# Patient Record
Sex: MALE | Race: WHITE | NOT HISPANIC OR LATINO | Employment: FULL TIME | URBAN - METROPOLITAN AREA
[De-identification: names, ages, dates, MRNs, and addresses within clinical notes are randomized per-mention and may not be internally consistent; named-entity substitution may affect disease eponyms.]

---

## 2017-05-11 ENCOUNTER — TELEPHONE (OUTPATIENT)
Dept: FAMILY MEDICINE | Facility: CLINIC | Age: 42
End: 2017-05-11

## 2017-05-11 DIAGNOSIS — F41.1 ANXIETY STATE: ICD-10-CM

## 2017-05-11 RX ORDER — PROPRANOLOL HYDROCHLORIDE 20 MG/1
20 TABLET ORAL DAILY
Qty: 30 TABLET | Refills: 0 | COMMUNITY
Start: 2017-05-11 | End: 2017-07-19

## 2017-05-11 NOTE — TELEPHONE ENCOUNTER
OK refill of propranolol for 1 month sent to [facility]. Pt needs non fasting OV for refills.    Thanks,Leann  [phone] (home)

## 2017-05-17 ENCOUNTER — MYC MEDICAL ADVICE (OUTPATIENT)
Dept: FAMILY MEDICINE | Facility: CLINIC | Age: 42
End: 2017-05-17

## 2017-05-17 NOTE — TELEPHONE ENCOUNTER
Sent patient a [employer] message stating that his brother can see Dr. Bunch or Susie Pyle, but that Dr. Menon is not currently taking new patients.

## 2017-05-22 DIAGNOSIS — L65.9 ALOPECIA: ICD-10-CM

## 2017-05-22 RX ORDER — FINASTERIDE 5 MG/1
5 TABLET, FILM COATED ORAL DAILY
Qty: 9 TABLET | Refills: 0 | Status: SHIPPED | OUTPATIENT
Start: 2017-05-22 | End: 2017-06-29

## 2017-05-22 NOTE — TELEPHONE ENCOUNTER
Gerber Jackson is requesting a refill of:    Pending Prescriptions:                       Disp   Refills    finasteride (PROSCAR) 5 MG tablet         9 tabl*0            Sig: Take 1 tablet (5 mg) by mouth daily 1/4  TABLET           DAILY    Pt last seen 5/20/16. Pt needs Ov for refills.  has called him on 5/11/17 for his other medication    Please advise  Thanks,Leann

## 2017-06-27 DIAGNOSIS — L65.9 ALOPECIA: ICD-10-CM

## 2017-06-27 RX ORDER — FINASTERIDE 5 MG/1
5 TABLET, FILM COATED ORAL DAILY
Qty: 9 TABLET | Refills: 0 | OUTPATIENT
Start: 2017-06-27

## 2017-06-27 NOTE — TELEPHONE ENCOUNTER
Refill Request Received for the following med:  Refused Prescriptions:                       Disp   Refills    finasteride (PROSCAR) 5 MG tablet          9 tabl*0        Sig: Take 1 tablet (5 mg) by mouth daily 1/4  TABLET DAILY  Refused By: ROSA AQUINO  Reason for Refusal: Patient needs appointment    Patient received an extension of this medication on 05/22/2017. This was to be his last prescription since it has been over a year since he has been seen. The patient has not come to be seen and still has no appointments scheduled.    Refused the latest request and sent the refusal to the pharmacy.    Is this ok or would you like to send in a refill?    Patient Call Back Info:  [phone] (home)     Rosa Aquino [facility]

## 2017-06-28 ENCOUNTER — MYC MEDICAL ADVICE (OUTPATIENT)
Dept: FAMILY MEDICINE | Facility: CLINIC | Age: 42
End: 2017-06-28

## 2017-06-28 DIAGNOSIS — L65.9 ALOPECIA: ICD-10-CM

## 2017-06-29 RX ORDER — FINASTERIDE 5 MG/1
TABLET, FILM COATED ORAL
Qty: 10 TABLET | Refills: 0 | Status: SHIPPED | OUTPATIENT
Start: 2017-06-29 | End: 2017-07-19

## 2017-06-29 NOTE — TELEPHONE ENCOUNTER
Gerber Jackson is requesting a refill of:    Pending Prescriptions:                       Disp   Refills    finasteride (PROSCAR) 5 MG tablet         9 tabl*0            Sig: Take 1 tablet (5 mg) by mouth daily 1/4  TABLET           DAILY    Pt has OV on 7/19/17. Are you willing to fill?  Thanks,Leann

## 2017-06-29 NOTE — TELEPHONE ENCOUNTER
From: Leann Amaya CMA  To: Gerber Jackson  Sent: 6/28/2017 2:06 PM CDT  Subject: refill    Veto,    We have denied you refill of finasteride. We have not seen you in over 1 year. Please call us at [phone] to make an appointment for a medication check.    Thanks,Leann

## 2017-07-18 NOTE — PROGRESS NOTES
SUBJECTIVE:     CC: Gerber Jackson is an 42 year old male who presents for preventative health visit.     Healthy Habits:      Amount of exercise or daily activities, outside of work: 5 day(s) per week    Problems taking medications regularly No    Medication side effects: No    Have you had an eye exam in the past two years? yes    Do you see a dentist twice per year? yes    Do you have sleep apnea, excessive snoring or daytime drowsiness?no    1. Performance anxiety  - taking 1-2 x a week. But may take up to 5 days a week if under stress    2. Taking Proscar for hair loss    3. In the last several  months In the am - with heavier workout - will get dizziness  Pain in chest occ. - also occ chest.   Exercising - dizziness with heavy lifting  Random - sometimes in am driving will have some chest pains.  No family hx cardiac events    4. Would like his mercury levels checked - eating salmon 4 x week        Today's PHQ-2 Score:   PHQ-2 ( 1999 Pfizer) 7/19/2017 5/20/2016   Q1: Little interest or pleasure in doing things 0 0   Q2: Feeling down, depressed or hopeless 0 0   PHQ-2 Score 0 0         Social History   Substance Use Topics     Smoking status: Never Smoker     Smokeless tobacco: Never Used     Alcohol use 7.2 oz/week     12 Standard drinks or equivalent per week     The patient does not drink >3 drinks per day nor >7 drinks per week.    Last PSA: No results found for: PSA    Recent Labs   Lab Test  05/20/16   0952  06/27/14   1114   CHOL  202*  205*   HDL  55  57   LDL  132*  131*   TRIG  77  83   CHOLHDLRATIO  3.7  3.6       Reviewed orders with patient. Reviewed health maintenance and updated orders accordingly - Yes    All Histories reviewed and updated in Epic.  No past medical history on file.   Past Surgical History:   Procedure Laterality Date     C REVISE STAPEDECTOMY/STAPEDOTOMY       C REVISE STAPEDECTOMY/STAPEDOTOMY       C STAPEDECTOMY,FOOTPLATE DRILL OUT      dr jocelyne macias      CREATE EARDRUM  OPENING,LOCAL ANESTH      Tympanostomy     CREATE EARDRUM OPENING,LOCAL ANESTH      Tympanostomy     HC EXCISION OF CHOLESTEATOMA, MIDDLE EAR  2015    right , [facility]  2017     STAPEDECTOMY  2015    [facility]        ROS:  C: NEGATIVE for fever, chills, change in weight  I: NEGATIVE for worrisome rashes, moles or lesions  E: NEGATIVE for vision changes or irritation  ENT: NEGATIVE for ear, mouth and throat problems - hearing improved with surgery  GI: NEGATIVE for nausea, abdominal pain, heartburn, or change in bowel habits   male: negative for dysuria, hematuria, decreased urinary stream, erectile dysfunction, urethral discharge  M: NEGATIVE for significant arthralgias or myalgia      Problem list, Medication list, Allergies, and Medical/Social/Surgical histories reviewed in [facility] and updated as appropriate.  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     Anxiety state     Alopecia     Health Care Home     Bradycardia     Past Surgical History:   Procedure Laterality Date     C REVISE STAPEDECTOMY/STAPEDOTOMY       C REVISE STAPEDECTOMY/STAPEDOTOMY       C STAPEDECTOMY,FOOTPLATE DRILL OUT      dr jocelyne macias      CREATE EARDRUM OPENING,LOCAL ANESTH      Tympanostomy     CREATE EARDRUM OPENING,LOCAL ANESTH      Tympanostomy     HC EXCISION OF CHOLESTEATOMA, MIDDLE EAR  2015    [facility]  [facility]     STAPEDECTOMY  2015    [facility]        Social History   Substance Use Topics     Smoking status: Never Smoker     Smokeless tobacco: Never Used     Alcohol use 7.2 oz/week     12 Standard drinks or equivalent per week     Family History   Problem Relation Age of Onset     Lipids  "Father      Hypertension Father      CEREBROVASCULAR DISEASE Maternal Grandfather      Alcoholism Brother      sober     Substance Abuse Brother      drug abuse - Clean since 35         Current Outpatient Prescriptions   Medication Sig Dispense Refill     propranolol (INDERAL) 20 MG tablet Take 1 tablet (20 mg) by mouth daily Take for performance anxiety prn 90 tablet 1     finasteride (PROSCAR) 5 MG tablet 1/4  TABLET DAILY 24 tablet 3     [DISCONTINUED] finasteride (PROSCAR) 5 MG tablet 1/4  TABLET DAILY 10 tablet 0     [DISCONTINUED] propranolol (INDERAL) 20 MG tablet Take 1 tablet (20 mg) by mouth daily Take for performance anxiety prn 30 tablet 0     Allergies   Allergen Reactions     No Known Drug Allergies      Recent Labs   Lab Test  05/20/16   0952  06/27/14   1114  04/19/13   0914  08/31/11   LDL  132*  131*  136*   < >  199*   HDL  55  57  57   < >  53   TRIG  77  83  99   < >  144   ALT  17   --    --    --   15   CR  1.01   --    --    --   1.05   GFRESTIMATED  94   --    --    --   92   POTASSIUM  4.2   --    --    --   4.2   TSH   --    --    --    --   3.05    < > = values in this interval not displayed.      OBJECTIVE:     /72 (BP Location: Right arm, Patient Position: Chair, Cuff Size: Adult Regular)  Pulse (!) 49  Temp 97.4  F (36.3  C) (Oral)  Ht 1.702 m (5' 7\")  Wt 70.6 kg (155 lb 9.6 oz)  SpO2 98%  BMI 24.37 kg/m2  EXAM:  GENERAL: healthy, alert and no distress  EYES: Eyes grossly normal to inspection, PERRL and conjunctivae and sclerae normal  HENT: ear canals and TM's normal, nose and mouth without ulcers or lesions  NECK: no adenopathy, no asymmetry, masses, or scars and thyroid normal to palpation  RESP: lungs clear to auscultation - no rales, rhonchi or wheezes  CV: regular rate and rhythm, normal S1 S2, no S3 or S4, no murmur, click or rub  ABDOMEN: soft, nontender, no hepatosplenomegaly, no masses and bowel sounds normal   (male): normal male genitalia without lesions or " "urethral discharge, no hernia  MS: no gross musculoskeletal defects noted, no edema  SKIN: no suspicious lesions or rashes  NEURO: Normal strength and tone, mentation intact and speech normal  PSYCH: mentation appears normal, affect normal/bright    EKG Sinus atul rate 46    Mental Status Exam:   Appearance: anxious  Grooming: adequately groomed  Demeanor: engaged, cooperative  Affect: normal  Speech: Normal.  Gait:Normal.  Movements: Normal  Form of thought: Logical, Linear and Goal directed  Thought content:  Normal  Insight:Good   Judgment: Good   Cognition: Good       ASSESSMENT/PLAN:     1. Anxiety state  Return to clinic for recheck off BB for 48 hours,I'm  concerned about  Low HR and BB use  - propranolol (INDERAL) 20 MG tablet; Take 1 tablet (20 mg) by mouth daily Take for performance anxiety prn  Dispense: 90 tablet; Refill: 1    2. Alopecia  - finasteride (PROSCAR) 5 MG tablet; 1/4  TABLET DAILY  Dispense: 24 tablet; Refill: 3    3. Exertional chest pain  - EKG 12-lead complete w/read - Clinics  - Exercise Stress Echocardiogram; Future  ED with acute CP    4. Routine history and physical examination of adult    - Glucose Fasting (BFP)  - Lipid Profile  - VENOUS COLLECTION    5. Bradycardia    Advised mercury testing not done here  Lab orders given.  I do not think this is needed and probably will not be covered by insurance. Patient will check OOP cost.     COUNSELING:   Special attention given to:        Regular exercise       Healthy diet/nutrition       Vision screening         Blood Pressure:   BP Readings from Last 6 Encounters:   07/19/17 104/72   05/20/16 118/72   07/31/15 112/64   06/27/14 120/80   04/15/14 120/80   04/19/13 128/70              reports that he has never smoked. He has never used smokeless tobacco.      Estimated body mass index is 24.37 kg/(m^2) as calculated from the following:    Height as of this encounter: 1.702 m (5' 7\").    Weight as of this encounter: 70.6 kg (155 lb 9.6 " oz).         Counseling Resources:  ATP IV Guidelines  Pooled Cohorts Equation Calculator  FRAX Risk Assessment  ICSI Preventive Guidelines  Dietary Guidelines for Americans, 2010  USDA's MyPlate  ASA Prophylaxis  Lung CA Screening    ANDIE Clarke  [facility]

## 2017-07-19 ENCOUNTER — OFFICE VISIT (OUTPATIENT)
Dept: FAMILY MEDICINE | Facility: CLINIC | Age: 42
End: 2017-07-19

## 2017-07-19 VITALS
WEIGHT: 155.6 LBS | HEIGHT: 67 IN | DIASTOLIC BLOOD PRESSURE: 72 MMHG | OXYGEN SATURATION: 98 % | BODY MASS INDEX: 24.42 KG/M2 | TEMPERATURE: 97.4 F | SYSTOLIC BLOOD PRESSURE: 104 MMHG | HEART RATE: 49 BPM

## 2017-07-19 DIAGNOSIS — R07.9 EXERTIONAL CHEST PAIN: Primary | ICD-10-CM

## 2017-07-19 DIAGNOSIS — Z00.00 ROUTINE HISTORY AND PHYSICAL EXAMINATION OF ADULT: ICD-10-CM

## 2017-07-19 DIAGNOSIS — L65.9 ALOPECIA: ICD-10-CM

## 2017-07-19 DIAGNOSIS — R00.1 BRADYCARDIA: ICD-10-CM

## 2017-07-19 DIAGNOSIS — F41.1 ANXIETY STATE: ICD-10-CM

## 2017-07-19 LAB — GLUCOSE SERPL-MCNC: 86 MG/DL (ref 60–99)

## 2017-07-19 PROCEDURE — 80061 LIPID PANEL: CPT | Mod: 90 | Performed by: PHYSICIAN ASSISTANT

## 2017-07-19 PROCEDURE — 36415 COLL VENOUS BLD VENIPUNCTURE: CPT | Performed by: PHYSICIAN ASSISTANT

## 2017-07-19 PROCEDURE — 93000 ELECTROCARDIOGRAM COMPLETE: CPT | Performed by: PHYSICIAN ASSISTANT

## 2017-07-19 PROCEDURE — 82947 ASSAY GLUCOSE BLOOD QUANT: CPT | Performed by: PHYSICIAN ASSISTANT

## 2017-07-19 PROCEDURE — 99396 PREV VISIT EST AGE 40-64: CPT | Performed by: PHYSICIAN ASSISTANT

## 2017-07-19 RX ORDER — PROPRANOLOL HYDROCHLORIDE 20 MG/1
20 TABLET ORAL DAILY
Qty: 90 TABLET | Refills: 1 | Status: SHIPPED | OUTPATIENT
Start: 2017-07-19 | End: 2018-06-02

## 2017-07-19 RX ORDER — FINASTERIDE 5 MG/1
TABLET, FILM COATED ORAL
Qty: 24 TABLET | Refills: 3 | Status: SHIPPED | OUTPATIENT
Start: 2017-07-19 | End: 2018-07-08

## 2017-07-19 ASSESSMENT — ANXIETY QUESTIONNAIRES
5. BEING SO RESTLESS THAT IT IS HARD TO SIT STILL: SEVERAL DAYS
6. BECOMING EASILY ANNOYED OR IRRITABLE: NOT AT ALL
IF YOU CHECKED OFF ANY PROBLEMS ON THIS QUESTIONNAIRE, HOW DIFFICULT HAVE THESE PROBLEMS MADE IT FOR YOU TO DO YOUR WORK, TAKE CARE OF THINGS AT HOME, OR GET ALONG WITH OTHER PEOPLE: SOMEWHAT DIFFICULT
2. NOT BEING ABLE TO STOP OR CONTROL WORRYING: SEVERAL DAYS
3. WORRYING TOO MUCH ABOUT DIFFERENT THINGS: MORE THAN HALF THE DAYS
7. FEELING AFRAID AS IF SOMETHING AWFUL MIGHT HAPPEN: NOT AT ALL
1. FEELING NERVOUS, ANXIOUS, OR ON EDGE: SEVERAL DAYS
GAD7 TOTAL SCORE: 6

## 2017-07-19 ASSESSMENT — PATIENT HEALTH QUESTIONNAIRE - PHQ9: 5. POOR APPETITE OR OVEREATING: SEVERAL DAYS

## 2017-07-19 NOTE — NURSING NOTE
"Gerber Jackson is here for a CPX. Fasting.    Pre-visit Planning  Immunizations -up to date  Colonoscopy -NA  Mammogram -NA  Asthma test --NA  PHQ2 -is completed today  PHQ9: completed today  HIRAM 7 -completed today  Fall Risk Assessment -NA  CAGE: completed today  Vitals:  BP Cuff right  Arm with regular Cuff  PULSE regular  155 lbs 9.6 oz and 5' 7\"  CLASSIFICATION OF OVERWEIGHT AND OBESITY BY BMI                        Obesity Class           BMI(kg/m2)  Underweight                                    < 18.5  Normal                                         18.5-24.9  Overweight                                     25.0-29.9  OBESITY                     I                  30.0-34.9                             II                 35.0-39.9  EXTREME OBESITY             III                >40      Patient's  BMI Body mass index is 24.37 kg/(m^2).  Http://hin.nhlbi.nih.gov/menuplanner/menu.cgi      Roomed By: RUDY Hidalgo ([facility])    "

## 2017-07-19 NOTE — MR AVS SNAPSHOT
After Visit Summary   7/19/2017    Gerber Jackson    MRN: [id number]           Patient Information     Date Of Birth          1975        Visit Information        Provider Department      7/19/2017 8:30 AM Jennifer Weber PA [facility]        Today's Diagnoses     Exertional chest pain    -  1    Anxiety state        Alopecia        Routine history and physical examination of adult        Bradycardia           Follow-ups after your visit        Future tests that were ordered for you today     Open Future Orders        Priority Expected Expires Ordered    Exercise Stress Echocardiogram Routine  7/19/2018 7/19/2017            Who to contact     If you have questions or need follow up information about today's clinic visit or your schedule please contact [facility] directly at [phone].  Normal or non-critical lab and imaging results will be communicated to you by TeamPatenthart, letter or phone within 4 business days after the clinic has received the results. If you do not hear from us within 7 days, please contact the clinic through TeamPat[employer]art or phone. If you have a critical or abnormal lab result, we will notify you by phone as soon as possible.  Submit refill requests through [employer] or call your pharmacy and they will forward the refill request to us. Please allow 3 business days for your refill to be completed.          Additional Information About Your Visit        MyChart Information     [employer] gives you secure access to your electronic health record. If you see a primary care provider, you can also send messages to your care team and make appointments. If you have questions, please call your primary care clinic.  If you do not have a primary care provider, please call [phone] and they will assist you.        Care EveryWhere ID     This is your Care EveryWhere ID. This could be used by other organizations to access your [place]  "medical records  [id number]        Your Vitals Were     Pulse Temperature Height Pulse Oximetry BMI (Body Mass Index)       49 97.4  F (36.3  C) (Oral) 1.702 m (5' 7\") 98% 24.37 kg/m2        Blood Pressure from Last 3 Encounters:   07/19/17 104/72   05/20/16 118/72   07/31/15 112/64    Weight from Last 3 Encounters:   07/19/17 70.6 kg (155 lb 9.6 oz)   05/20/16 68.2 kg (150 lb 6.4 oz)   07/31/15 71.3 kg (157 lb 3.2 oz)              We Performed the Following     EKG 12-lead complete w/read - Clinics     Glucose Fasting (BFP)     Lipid Profile     VENOUS COLLECTION          Where to get your medicines      These medications were sent to [place] [address] [place] [facility] [address] [facility]  [address], [place] [address]     Phone:  [phone]     finasteride 5 MG tablet    propranolol 20 MG tablet          Primary Care Provider Office Phone # Fax #    ANDIE Clarke [phone]       [facility]  [address]  [facility] [address]        Equal Access to Services     YEIMY ROMAN : Hadii orlin ku hadasho Soomaali, waaxda luqadaha, qaybta kaalmada adeegyada, lizz wheeler. So [facility] [phone].    ATENCIÓN: Si habla español, tiene a frazier disposición servicios gratuitos de asistencia lingüística. Llame al [phone].    We comply with applicable federal civil rights laws and [place] laws. We do not discriminate on the basis of race, color, national origin, age, disability sex, sexual orientation or gender identity.            Thank you!     Thank you for choosing [facility]  for your care. Our goal is always to provide you with excellent care. Hearing back from our patients is one way we can continue to improve our services. Please take a few minutes to complete the written survey that you may receive in the mail after your visit with us. Thank you!             Your Updated Medication List - Protect " others around you: Learn how to safely use, store and throw away your medicines at www.disposemymeds.org.          This list is accurate as of: 7/19/17  9:37 AM.  Always use your most recent med list.                   Brand Name Dispense Instructions for use Diagnosis    finasteride 5 MG tablet    PROSCAR    24 tablet    1/4  TABLET DAILY    Alopecia       propranolol 20 MG tablet    INDERAL    90 tablet    Take 1 tablet (20 mg) by mouth daily Take for performance anxiety prn    Anxiety state

## 2017-07-20 LAB
CHOLEST SERPL-MCNC: 242 MG/DL (ref 125–200)
CHOLEST/HDLC SERPL: 4.2 (CALC)
HDLC SERPL-MCNC: 57 MG/DL
LDLC SERPL CALC-MCNC: 167 MG/DL (CALC)
NONHDLC SERPL-MCNC: 185 MG/DL (CALC)
TRIGL SERPL-MCNC: 88 MG/DL

## 2017-07-20 ASSESSMENT — ANXIETY QUESTIONNAIRES: GAD7 TOTAL SCORE: 6

## 2017-07-20 ASSESSMENT — PATIENT HEALTH QUESTIONNAIRE - PHQ9: SUM OF ALL RESPONSES TO PHQ QUESTIONS 1-9: 1

## 2017-07-27 ENCOUNTER — HOSPITAL ENCOUNTER (OUTPATIENT)
Dept: CARDIOLOGY | Facility: CLINIC | Age: 42
Discharge: HOME | End: 2017-07-27
Attending: PHYSICIAN ASSISTANT | Admitting: PHYSICIAN ASSISTANT
Payer: COMMERCIAL

## 2017-07-27 DIAGNOSIS — R07.9 EXERTIONAL CHEST PAIN: ICD-10-CM

## 2017-07-27 PROCEDURE — 40000264 ECHO STRESS TEST WITH LUMASON

## 2017-07-27 PROCEDURE — 93018 CV STRESS TEST I&R ONLY: CPT | Performed by: INTERNAL MEDICINE

## 2017-07-27 PROCEDURE — 93016 CV STRESS TEST SUPVJ ONLY: CPT | Performed by: INTERNAL MEDICINE

## 2017-07-27 PROCEDURE — 93325 DOPPLER ECHO COLOR FLOW MAPG: CPT | Mod: 26 | Performed by: INTERNAL MEDICINE

## 2017-07-27 PROCEDURE — 25500064 ZZH RX 255 OP 636: Performed by: PHYSICIAN ASSISTANT

## 2017-07-27 PROCEDURE — 93321 DOPPLER ECHO F-UP/LMTD STD: CPT | Mod: 26 | Performed by: INTERNAL MEDICINE

## 2017-07-27 PROCEDURE — 93350 STRESS TTE ONLY: CPT | Mod: 26 | Performed by: INTERNAL MEDICINE

## 2017-07-27 RX ADMIN — SULFUR HEXAFLUORIDE 4 ML: KIT at 08:36

## 2017-07-27 NOTE — PROGRESS NOTES
Stress echo completed. Contrast Lumason Used for image enhancement. Had to Redo test due to late echo images.

## 2017-07-31 ENCOUNTER — OFFICE VISIT (OUTPATIENT)
Dept: FAMILY MEDICINE | Facility: CLINIC | Age: 42
End: 2017-07-31

## 2017-07-31 VITALS
HEART RATE: 52 BPM | WEIGHT: 159 LBS | BODY MASS INDEX: 24.9 KG/M2 | SYSTOLIC BLOOD PRESSURE: 100 MMHG | OXYGEN SATURATION: 98 % | TEMPERATURE: 98 F | DIASTOLIC BLOOD PRESSURE: 80 MMHG

## 2017-07-31 DIAGNOSIS — F41.1 ANXIETY STATE: ICD-10-CM

## 2017-07-31 DIAGNOSIS — R00.1 BRADYCARDIA: Primary | ICD-10-CM

## 2017-07-31 PROCEDURE — 99213 OFFICE O/P EST LOW 20 MIN: CPT | Performed by: PHYSICIAN ASSISTANT

## 2017-07-31 NOTE — NURSING NOTE
Gerber is here for a recheck for a low pulse.     Pre-Visit Screening :  Immunizations : up to date  Colon Screening : NA  Asthma Action Test/Plan : NA  PHQ9/GAD7 : utd    Pulse - regular  My Chart - accepts    CLASSIFICATION OF OVERWEIGHT AND OBESITY BY BMI                         Obesity Class           BMI(kg/m2)  Underweight                                    < 18.5  Normal                                         18.5-24.9  Overweight                                     25.0-29.9  OBESITY                     I                  30.0-34.9                              II                 35.0-39.9  EXTREME OBESITY             III                >40                             Patient's  BMI Body mass index is 24.9 kg/(m^2).  http://hin.nhlbi.nih.gov/menuplanner/menu.cgi  Questioned patient about current smoking habits.  Pt. has never smoked.    Layne.RUDY Palmer ([facility])

## 2017-07-31 NOTE — MR AVS SNAPSHOT
After Visit Summary   7/31/2017    Gerber Jackson    MRN: [id number]           Patient Information     Date Of Birth          1975        Visit Information        Provider Department      7/31/2017 4:00 PM Jennifer Weber PA [facility]        Today's Diagnoses     Bradycardia    -  1    Anxiety state           Follow-ups after your visit        Who to contact     If you have questions or need follow up information about today's clinic visit or your schedule please contact [facility] directly at [phone].  Normal or non-critical lab and imaging results will be communicated to you by [employer], letter or phone within 4 business days after the clinic has received the results. If you do not hear from us within 7 days, please contact the clinic through [employer] or phone. If you have a critical or abnormal lab result, we will notify you by phone as soon as possible.  Submit refill requests through [employer] or call your pharmacy and they will forward the refill request to us. Please allow 3 business days for your refill to be completed.          Additional Information About Your Visit        MyChart Information     [employer] gives you secure access to your electronic health record. If you see a primary care provider, you can also send messages to your care team and make appointments. If you have questions, please call your primary care clinic.  If you do not have a primary care provider, please call [phone] and they will assist you.        Care EveryWhere ID     This is your Care EveryWhere ID. This could be used by other organizations to access your [place] medical records  [id number]        Your Vitals Were     Pulse Temperature Pulse Oximetry BMI (Body Mass Index)          52 98  F (36.7  C) (Oral) 98% 24.9 kg/m2         Blood Pressure from Last 3 Encounters:   07/31/17 100/80   07/19/17 104/72   05/20/16 118/72    Weight from Last 3  Encounters:   07/31/17 72.1 kg (159 lb)   07/19/17 70.6 kg (155 lb 9.6 oz)   05/20/16 68.2 kg (150 lb 6.4 oz)              Today, you had the following     No orders found for display       Primary Care Provider Office Phone # Fax #    ANDIE Clarke [phone] [address]  [facility] [address]        Equal Access to Services     PHILIPPE [facility] SystemNIYAH : Hadii aad ku hadasho Soomaali, waaxda luqadaha, qaybta kaalmada adeegyada, waxay idiin hayaan adeeg kharash lacesar . So [facility] [phone].    ATENCIÓN: Si habla español, tiene a frazier disposición servicios gratuitos de asistencia lingüística. LlMarion [facility] [phone].    We comply with applicable federal civil rights laws and [place] laws. We do not discriminate on the basis of race, color, national origin, age, disability sex, sexual orientation or gender identity.            Thank you!     Thank you for choosing [facility]  for your care. Our goal is always to provide you with excellent care. Hearing back from our patients is one way we can continue to improve our services. Please take a few minutes to complete the written survey that you may receive in the mail after your visit with us. Thank you!             Your Updated Medication List - Protect others around you: Learn how to safely use, store and throw away your medicines at www.disposemymeds.org.          This list is accurate as of: 7/31/17 11:59 PM.  Always use your most recent med list.                   Brand Name Dispense Instructions for use Diagnosis    finasteride 5 MG tablet    PROSCAR    24 tablet    1/4  TABLET DAILY    Alopecia       propranolol 20 MG tablet    INDERAL    90 tablet    Take 1 tablet (20 mg) by mouth daily Take for performance anxiety prn    Anxiety state

## 2017-07-31 NOTE — PROGRESS NOTES
SUBJECTIVE:                                                    Gerber Jackson is a 42 year old male who presents to clinic today for the following health issues:    Here for FU on bradycardia  Was taking BB several times a week for performance anxiety   Pulse last OV 49    Last Thursday was last dose of BB    Occ will get dizziness with working out but hasn't happened in a month    More with intense/heavy lifting.     Taking propranolol 1 day a week. Up to 3 x a week.         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     Anxiety state     Alopecia     Health Care Home     Bradycardia     Past Surgical History:   Procedure Laterality Date     C REVISE STAPEDECTOMY/STAPEDOTOMY       C REVISE STAPEDECTOMY/STAPEDOTOMY       C STAPEDECTOMY,FOOTPLATE DRILL OUT      dr jocelyne macias      CREATE EARDRUM OPENING,LOCAL ANESTH      Tympanostomy     CREATE EARDRUM OPENING,LOCAL ANESTH      Tympanostomy     HC EXCISION OF CHOLESTEATOMA, MIDDLE EAR  2015    right , [facility]      LASIK  2017     STAPEDECTOMY  2015    [facility]        Social History   Substance Use Topics     Smoking status: Never Smoker     Smokeless tobacco: Never Used     Alcohol use 7.2 oz/week     12 Standard drinks or equivalent per week     Family History   Problem Relation Age of Onset     Lipids Father      Hypertension Father      CEREBROVASCULAR DISEASE Maternal Grandfather      Alcoholism Brother      sober     Substance Abuse Brother      drug abuse - Clean since 35         Current Outpatient Prescriptions   Medication Sig Dispense Refill     propranolol (INDERAL) 20 MG tablet Take 1 tablet (20 mg) by mouth daily Take for performance anxiety prn 90  tablet 1     finasteride (PROSCAR) 5 MG tablet 1/4  TABLET DAILY 24 tablet 3     Allergies   Allergen Reactions     No Known Drug Allergies      Recent Labs   Lab Test  07/19/17   0947  05/20/16   0952  06/27/14   1114  08/31/11   LDL  167*  132*  131*   < >  199*   HDL  57  55  57   < >  53   TRIG  88  77  83   < >  144   ALT   --   17   --    --   15   CR   --   1.01   --    --   1.05   GFRESTIMATED   --   94   --    --   92   POTASSIUM   --   4.2   --    --   4.2   TSH   --    --    --    --   3.05    < > = values in this interval not displayed.              OBJECTIVE:   /80 (BP Location: Right arm, Patient Position: Chair, Cuff Size: Adult Regular)  Pulse 52  Temp 98  F (36.7  C) (Oral)  Wt 72.1 kg (159 lb)  SpO2 98%  BMI 24.9 kg/m2   Body mass index is 24.9 kg/(m^2).       Mental Status Exam:   Appearance: calm  Grooming: adequately groomed  Demeanor: engaged, cooperative  Affect: normal  Speech: Normal.  Gait:Normal.  Movements: Normal  Form of thought: Logical, Linear and Goal directed  Thought content:  Normal  Insight:Good   Judgment: Good   Cognition: Good             ASSESSMENT/PLAN:                                                    1. Bradycardia  Cont. To monitor    2. Anxiety state  Controlled  Discussed SSRI  Will RTC to start/discuss if taking propranolol > 3 x week regularly      Follow Up: 1 year CPE      Jennifer Weber PA-C  [facility]

## 2018-06-02 DIAGNOSIS — F41.1 ANXIETY STATE: ICD-10-CM

## 2018-06-02 NOTE — TELEPHONE ENCOUNTER
Pending Prescriptions:                       Disp   Refills    propranolol (INDERAL) 20 MG tablet [Pharm*30 tab*0            Sig: TAKE 1 TABLET BY MOUTH DAILY. TAKE FOR           PERFORMANCE ANXIETY AS NEEDED    Pt is taking this for anxiety  Pt is due for yearly ov in July  Please fax 30 and send to [facility], let them know if pt needs to be fasting or not  Estefany  [phone] (home) NONE (work)

## 2018-06-04 RX ORDER — PROPRANOLOL HYDROCHLORIDE 20 MG/1
TABLET ORAL
Qty: 30 TABLET | Refills: 0 | Status: SHIPPED | OUTPATIENT
Start: 2018-06-04 | End: 2018-09-07

## 2018-06-04 NOTE — TELEPHONE ENCOUNTER
30 days signed  Sent pt [employer] message due for fasting CPE in July    Jennifer Weber PA-C  6/4/2018

## 2018-06-23 DIAGNOSIS — F41.1 ANXIETY STATE: ICD-10-CM

## 2018-06-25 RX ORDER — PROPRANOLOL HYDROCHLORIDE 20 MG/1
TABLET ORAL
Qty: 30 TABLET | Refills: 0 | Status: SHIPPED | OUTPATIENT
Start: 2018-06-25 | End: 2018-09-07

## 2018-06-25 NOTE — TELEPHONE ENCOUNTER
Pending Prescriptions:                       Disp   Refills    propranolol (INDERAL) 20 MG tablet [Pharm*90 tab*1            Sig: TAKE 1 TABLET BY MOUTH DAILY. TAKE FOR           PERFORMANCE ANXIETY AS NEEDED    You refilled for 30 days on 6-2018  Pt has a px on 7-   Do you want to refill for another 30 days?  Please fax or dwight Allison  [phone] (home) NONE (work)

## 2018-07-08 DIAGNOSIS — L65.9 ALOPECIA: ICD-10-CM

## 2018-07-09 RX ORDER — FINASTERIDE 5 MG/1
TABLET, FILM COATED ORAL
Qty: 24 TABLET | Refills: 0 | COMMUNITY
Start: 2018-07-09 | End: 2018-08-07

## 2018-08-06 DIAGNOSIS — L65.9 ALOPECIA: ICD-10-CM

## 2018-08-06 NOTE — TELEPHONE ENCOUNTER
Gerber Jackson is requesting a refill of:    Pending Prescriptions:                       Disp   Refills    finasteride (PROSCAR) 5 MG tablet         24 tab*0            Sig: TAKE ONE-FOURTH TABLET BY MOUTH DAILY DAILY

## 2018-08-06 NOTE — TELEPHONE ENCOUNTER
Gerber C Benson called the clinic support line with the following:    Is requesting a refill of a medication. Phone was cutting out. Needs [facility] to [facility] [place]?  Has OV 9/7/18    LMOM for Pt to call back

## 2018-08-07 ENCOUNTER — MYC MEDICAL ADVICE (OUTPATIENT)
Dept: FAMILY MEDICINE | Facility: CLINIC | Age: 43
End: 2018-08-07

## 2018-08-07 RX ORDER — FINASTERIDE 5 MG/1
TABLET, FILM COATED ORAL
Qty: 8 TABLET | Refills: 0 | COMMUNITY
Start: 2018-08-07 | End: 2018-09-07

## 2018-08-07 RX ORDER — FINASTERIDE 5 MG/1
TABLET, FILM COATED ORAL
Qty: 24 TABLET | Refills: 0 | OUTPATIENT
Start: 2018-08-07

## 2018-08-07 NOTE — TELEPHONE ENCOUNTER
Denied - this was already prescribed in July and pt advised appt    Please call pt - tell him no refills until OV    Jennifer Weber PA-C  8/7/2018

## 2018-08-07 NOTE — TELEPHONE ENCOUNTER
Patient called  upset that he has not heard back about his request for refills. Leann did send a [employer] message but the patient has been at a cabin without internet and has driven into town 2 times to see if his prescription could be picked up.    I spoke with him and he states he does have a CPX scheduled for 9/7/2018.    I reviewed his chart and on 7/9/2018 it states that Trinity called in a 1 month supply to his pharmacy and that she sent 24 tablets, which is a 96 day supply.    I spoke with the patient's pharmacy and they never received a refill in July at any of the [employer] stores. So I went ahead and called in a 30 day supply of 8 tablets to the pharmacy requested by the patient.    Signed Prescriptions:                        Disp   Refills    finasteride (PROSCAR) 5 MG tablet          8 tabl*0        Sig: TAKE ONE-FOURTH TABLET BY MOUTH DAILY DAILY  Authorizing Provider: NATHAN RAHMAN  Ordering User: ROSA AQUINO    Refused Prescriptions:                       Disp   Refills    finasteride (PROSCAR) 5 MG tablet          24 tab*0        Sig: TAKE ONE-FOURTH TABLET BY MOUTH DAILY DAILY  Refused By: NATHAN RAHMAN  Reason for Refusal: Appt required, please call patient    He was at the pharmacy so he has been informed that it has been filled.    Rosa Aquino, [facility]

## 2018-09-06 NOTE — PROGRESS NOTES
SUBJECTIVE:   CC: Gerber Jackson is an 43 year old male who presents for preventative health visit.     Healthy Habits:    Do you get at least three servings of calcium containing foods daily (dairy, green leafy vegetables, etc.)? yes    Amount of exercise or daily activities, outside of work: workout in am    Problems taking medications regularly No    Medication side effects: No    Have you had an eye exam in the past two years? yes    Do you see a dentist twice per year? yes    Do you have sleep apnea, excessive snoring or daytime drowsiness?no           Anxiety Follow-Up    Status since last visit: No change    Other associated symptoms:None    Complicating factors:   Significant life event: No   Current substance abuse: None  Depression symptoms: No    Propranolol 1/2 pill 2-3 x a week. Depends on work.       Propecia - father and brother with hair loss    HIRAM-7 SCORE 7/19/2017   Total Score 6       HIRAM-7    Today's PHQ-2 Score:   PHQ-2 ( 1999 Pfizer) 7/19/2017 5/20/2016   Q1: Little interest or pleasure in doing things 0 0   Q2: Feeling down, depressed or hopeless 0 0   PHQ-2 Score 0 0       Abuse: Current or Past(Physical, Sexual or Emotional)- No  Do you feel safe in your environment - Yes    Social History   Substance Use Topics     Smoking status: Never Smoker     Smokeless tobacco: Never Used     Alcohol use 4.2 oz/week     7 Standard drinks or equivalent per week      If you drink alcohol do you typically have >3 drinks per day or >7 drinks per week? No                      Last PSA: No results found for: PSA    Reviewed orders with patient. 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     Anxiety state     Alopecia     Health Care Home     Bradycardia     Pure hypercholesterolemia     Past Surgical History:   Procedure Laterality Date     C REVISE STAPEDECTOMY/STAPEDOTOMY       C REVISE STAPEDECTOMY/STAPEDOTOMY       C STAPEDECTOMY,FOOTPLATE DRILL OUT      dr jocelyne macias      CREATE EARDRUM OPENING,LOCAL ANESTH      Tympanostomy     CREATE EARDRUM OPENING,LOCAL ANESTH      Tympanostomy     HC EXCISION OF CHOLESTEATOMA, MIDDLE EAR  2015    right , [facility]      LASIK  2017     STAPEDECTOMY  2015    [facility]        Social History   Substance Use Topics     Smoking status: Never Smoker     Smokeless tobacco: Never Used     Alcohol use 4.2 oz/week     7 Standard drinks or equivalent per week     Family History   Problem Relation Age of Onset     Lipids Father      Hypertension Father      Cerebrovascular Disease Maternal Grandfather      Alcoholism Brother      sober     Substance Abuse Brother      drug abuse - Clean since 35     No Known Problems Mother      No Known Problems Son      No Known Problems Daughter      No Known Problems Daughter          Current Outpatient Prescriptions   Medication Sig Dispense Refill     finasteride (PROSCAR) 5 MG tablet TAKE ONE-FOURTH TABLET BY MOUTH DAILY DAILY 30 tablet 3     propranolol (INDERAL) 20 MG tablet TAKE 1 TABLET BY MOUTH DAILY. TAKE FOR PERFORMANCE ANXIETY AS NEEDED 90 tablet 1     [DISCONTINUED] finasteride (PROSCAR) 5 MG tablet TAKE ONE-FOURTH TABLET BY MOUTH DAILY DAILY 8 tablet 0     [DISCONTINUED] propranolol (INDERAL) 20 MG tablet TAKE 1 TABLET BY MOUTH DAILY. TAKE FOR PERFORMANCE ANXIETY AS NEEDED 30 tablet 0     [DISCONTINUED] propranolol (INDERAL) 20 MG tablet TAKE 1 TABLET BY MOUTH DAILY. TAKE FOR PERFORMANCE ANXIETY AS NEEDED 30 tablet 0     Allergies   Allergen Reactions     No Known Drug Allergies      Recent Labs   Lab Test  07/19/17   0947  05/20/16   0952  06/27/14   1114   "08/31/11   LDL  167*  132*  131*   < >  199*   HDL  57  55  57   < >  53   TRIG  88  77  83   < >  144   ALT   --   17   --    --   15   CR   --   1.01   --    --   1.05   GFRESTIMATED   --   94   --    --   92   POTASSIUM   --   4.2   --    --   4.2   TSH   --    --    --    --   3.05    < > = values in this interval not displayed.        Reviewed and updated as needed this visit by clinical staff  Tobacco  Allergies  Meds  Problems         Reviewed and updated as needed this visit by Provider        No past medical history on file.   Past Surgical History:   Procedure Laterality Date     C REVISE STAPEDECTOMY/STAPEDOTOMY       C REVISE STAPEDECTOMY/STAPEDOTOMY       C STAPEDECTOMY,FOOTPLATE DRILL OUT      dr jocelyne macias      CREATE EARDRUM OPENING,LOCAL ANESTH      Tympanostomy     CREATE EARDRUM OPENING,LOCAL ANESTH      Tympanostomy     HC EXCISION OF CHOLESTEATOMA, MIDDLE EAR  2015    right , [facility]      LASIK  2017     STAPEDECTOMY  2015    [facility]        ROS:  CONSTITUTIONAL: NEGATIVE for fever, chills, change in weight  INTEGUMENTARY/SKIN: large mole mid back for years - saw derm - benign  EYES: NEGATIVE for vision changes or irritation  ENT: NEGATIVE for ear, mouth and throat problems  RESP: NEGATIVE for significant cough or SOB  CV: NEGATIVE for chest pain, palpitations or peripheral edema  GI: NEGATIVE for nausea, abdominal pain, heartburn, or change in bowel habits   male: negative for dysuria, hematuria, decreased urinary stream, erectile dysfunction, urethral discharge  MUSCULOSKELETAL: NEGATIVE for significant arthralgias or myalgia  NEURO: NEGATIVE for weakness, dizziness or paresthesias  PSYCHIATRIC: NEGATIVE for changes in mood or affect    OBJECTIVE:   /72 (BP Location: Right arm, Patient Position: Chair, Cuff Size: Adult Regular)  Pulse 56  Temp 98.2  F (36.8  C) (Oral)  Resp 20  Ht 1.702 m (5' 7\")  Wt 73.8 kg (162 lb 9.6 oz)  BMI 25.47 kg/m2     EXAM:  GENERAL: " healthy, alert and no distress  EYES: Eyes grossly normal to inspection, PERRL and conjunctivae and sclerae normal  HENT: ear canals and TM's normal, nose and mouth without ulcers or lesions  NECK: no adenopathy, no asymmetry, masses, or scars and thyroid normal to palpation  RESP: lungs clear to auscultation - no rales, rhonchi or wheezes  CV: regular rate and rhythm, normal S1 S2, no S3 or S4, no murmur, click or rub, no peripheral edema and peripheral pulses strong  ABDOMEN: soft, nontender, no hepatosplenomegaly, no masses and bowel sounds normal   (male): normal male genitalia without lesions or urethral discharge, no hernia  MS: no gross musculoskeletal defects noted, no edema  SKIN: SK on right temporal area 4 mm diam, SK on mid back 8 mm in diam.   NEURO: Normal strength and tone, mentation intact and speech normal  PSYCH: mentation appears normal, affect normal/bright    Diagnostic Test Results:  none     ASSESSMENT/PLAN:   (Z00.00) Routine general medical examination at a health care facility  (primary encounter diagnosis)  Comment: annual  Plan: VENOUS COLLECTION, Lipid Profile, Glucose         Fasting (BFP)            (R00.1) Bradycardia  Comment: stable  Plan: Continue current medication(s) at current dose(s).      (L65.9) Alopecia  Comment: stable  Plan: finasteride (PROSCAR) 5 MG tablet        Continue current medication(s) at current dose(s).      (F41.1) Anxiety state  Comment: stable  Plan: propranolol (INDERAL) 20 MG tablet        Continue current medication(s) at current dose(s).      (Z23) Need for vaccination  Comment: Influenza Vaccine advised  Plan: given    (E78.00) Pure hypercholesterolemia  Comment: stable  Plan: labs today    COUNSELING:  Special attention given to:        Regular exercise       Healthy diet/nutrition       Vision screening       Hearing screening       Immunizations    Vaccinated for: Influenza          BP Readings from Last 1 Encounters:   09/07/18 122/72  "    Estimated body mass index is 25.47 kg/(m^2) as calculated from the following:    Height as of this encounter: 1.702 m (5' 7\").    Weight as of this encounter: 73.8 kg (162 lb 9.6 oz).    BP Screening:   Last 3 BP Readings:    BP Readings from Last 3 Encounters:   09/07/18 122/72   07/31/17 100/80   07/19/17 104/72       The following was recommended to the patient:  Re-screen BP within a year and recommended lifestyle modifications  Weight management plan: Discussed healthy diet and exercise guidelines and patient will follow up in 12 months in clinic to re-evaluate.     reports that he has never smoked. He has never used smokeless tobacco.      Counseling Resources:  ATP IV Guidelines  Pooled Cohorts Equation Calculator  FRAX Risk Assessment  ICSI Preventive Guidelines  Dietary Guidelines for Americans, 2010  USDA's MyPlate  ASA Prophylaxis  Lung CA Screening    ANDIE Clarke  [facility]  "

## 2018-09-07 ENCOUNTER — OFFICE VISIT (OUTPATIENT)
Dept: FAMILY MEDICINE | Facility: CLINIC | Age: 43
End: 2018-09-07

## 2018-09-07 VITALS
DIASTOLIC BLOOD PRESSURE: 72 MMHG | HEIGHT: 67 IN | HEART RATE: 56 BPM | RESPIRATION RATE: 20 BRPM | TEMPERATURE: 98.2 F | SYSTOLIC BLOOD PRESSURE: 122 MMHG | BODY MASS INDEX: 25.52 KG/M2 | WEIGHT: 162.6 LBS

## 2018-09-07 DIAGNOSIS — Z23 NEED FOR VACCINATION: ICD-10-CM

## 2018-09-07 DIAGNOSIS — Z00.00 ROUTINE GENERAL MEDICAL EXAMINATION AT A HEALTH CARE FACILITY: Primary | ICD-10-CM

## 2018-09-07 DIAGNOSIS — R00.1 BRADYCARDIA: ICD-10-CM

## 2018-09-07 DIAGNOSIS — E78.00 PURE HYPERCHOLESTEROLEMIA: ICD-10-CM

## 2018-09-07 DIAGNOSIS — F41.1 ANXIETY STATE: ICD-10-CM

## 2018-09-07 DIAGNOSIS — L65.9 ALOPECIA: ICD-10-CM

## 2018-09-07 LAB — GLUCOSE SERPL-MCNC: 82 MG/DL (ref 60–99)

## 2018-09-07 PROCEDURE — 82947 ASSAY GLUCOSE BLOOD QUANT: CPT | Performed by: PHYSICIAN ASSISTANT

## 2018-09-07 PROCEDURE — 90686 IIV4 VACC NO PRSV 0.5 ML IM: CPT | Performed by: PHYSICIAN ASSISTANT

## 2018-09-07 PROCEDURE — 36415 COLL VENOUS BLD VENIPUNCTURE: CPT | Performed by: PHYSICIAN ASSISTANT

## 2018-09-07 PROCEDURE — 90471 IMMUNIZATION ADMIN: CPT | Performed by: PHYSICIAN ASSISTANT

## 2018-09-07 PROCEDURE — 80061 LIPID PANEL: CPT | Mod: 90 | Performed by: PHYSICIAN ASSISTANT

## 2018-09-07 PROCEDURE — 99396 PREV VISIT EST AGE 40-64: CPT | Mod: 25 | Performed by: PHYSICIAN ASSISTANT

## 2018-09-07 RX ORDER — FINASTERIDE 5 MG/1
TABLET, FILM COATED ORAL
Qty: 30 TABLET | Refills: 3 | Status: SHIPPED | OUTPATIENT
Start: 2018-09-07 | End: 2019-09-19

## 2018-09-07 RX ORDER — PROPRANOLOL HYDROCHLORIDE 20 MG/1
TABLET ORAL
Qty: 90 TABLET | Refills: 1 | Status: SHIPPED | OUTPATIENT
Start: 2018-09-07 | End: 2019-03-03

## 2018-09-07 ASSESSMENT — ANXIETY QUESTIONNAIRES
IF YOU CHECKED OFF ANY PROBLEMS ON THIS QUESTIONNAIRE, HOW DIFFICULT HAVE THESE PROBLEMS MADE IT FOR YOU TO DO YOUR WORK, TAKE CARE OF THINGS AT HOME, OR GET ALONG WITH OTHER PEOPLE: NOT DIFFICULT AT ALL
7. FEELING AFRAID AS IF SOMETHING AWFUL MIGHT HAPPEN: NOT AT ALL
1. FEELING NERVOUS, ANXIOUS, OR ON EDGE: NOT AT ALL
6. BECOMING EASILY ANNOYED OR IRRITABLE: NOT AT ALL
3. WORRYING TOO MUCH ABOUT DIFFERENT THINGS: NOT AT ALL
2. NOT BEING ABLE TO STOP OR CONTROL WORRYING: NOT AT ALL
GAD7 TOTAL SCORE: 0
5. BEING SO RESTLESS THAT IT IS HARD TO SIT STILL: NOT AT ALL

## 2018-09-07 ASSESSMENT — PATIENT HEALTH QUESTIONNAIRE - PHQ9: 5. POOR APPETITE OR OVEREATING: NOT AT ALL

## 2018-09-07 NOTE — NURSING NOTE
Gerber Jackson is here for a CPX.    Pre-visit planning  Immunizations -up to date  Colonoscopy -is up to date  Mammogram -  Asthma test --  PHQ9 -  HIRAM 7 -  The patient has verbalized that it is ok to leave a detailed voice message on the patient's cell phone with results/recommendations from this visit.     Questioned patient about current smoking habits.  Pt. has never smoked.  Body mass index is 25.47 kg/(m^2).  PULSE regular  My Chart: active  CLASSIFICATION OF OVERWEIGHT AND OBESITY BY BMI                        Obesity Class           BMI(kg/m2)  Underweight                                    < 18.5  Normal                                         18.5-24.9  Overweight                                     25.0-29.9  OBESITY                     I                  30.0-34.9                             II                 35.0-39.9  EXTREME OBESITY             III                >40                            Patient's  BMI Body mass index is 25.47 kg/(m^2).  Http://hin.nhlbi.nih.gov/menuplanner/menu.cgi  ETOH screening:

## 2018-09-07 NOTE — MR AVS SNAPSHOT
After Visit Summary   9/7/2018    Gerber Jackson    MRN: [id number]           Patient Information     Date Of Birth          1975        Visit Information        Provider Department      9/7/2018 8:30 AM Jennifer Weber PA [facility]        Today's Diagnoses     Routine general medical examination at a health care facility    -  1    Bradycardia        Alopecia        Anxiety state        Need for vaccination        Pure hypercholesterolemia          Care Instructions      Preventive Health Recommendations  Male Ages 40 to 49    Yearly exam:             See your health care provider every year in order to  o   Review health changes.   o   Discuss preventive care.    o   Review your medicines if your doctor has prescribed any.    You should be tested each year for STDs (sexually transmitted diseases) if you re at risk.     Have a cholesterol test every 5 years.     Have a colonoscopy (test for colon cancer) if someone in your family has had colon cancer or polyps before age 50.     After age 45, have a diabetes test (fasting glucose). If you are at risk for diabetes, you should have this test every 3 years.      Talk with your health care provider about whether or not a prostate cancer screening test (PSA) is right for you.    Shots: Get a flu shot each year. Get a tetanus shot every 10 years.     Nutrition:    Eat at least 5 servings of fruits and vegetables daily.     Eat whole-grain bread, whole-wheat pasta and brown rice instead of white grains and rice.     Get adequate Calcium and Vitamin D.     Lifestyle    Exercise for at least 150 minutes a week (30 minutes a day, 5 days a week). This will help you control your weight and prevent disease.     Limit alcohol to one drink per day.     No smoking.     Wear sunscreen to prevent skin cancer.     See your dentist every six months for an exam and cleaning.              Follow-ups after your visit        Who to  "contact     If you have questions or need follow up information about today's clinic visit or your schedule please contact [facility] directly at [phone].  Normal or non-critical lab and imaging results will be communicated to you by MyChart, letter or phone within 4 business days after the clinic has received the results. If you do not hear from us within 7 days, please contact the clinic through MyChart or phone. If you have a critical or abnormal lab result, we will notify you by phone as soon as possible.  Submit refill requests through [employer] or call your pharmacy and they will forward the refill request to us. Please allow 3 business days for your refill to be completed.          Additional Information About Your Visit        [employer] information technology Information     [employer] gives you secure access to your electronic health record. If you see a primary care provider, you can also send messages to your care team and make appointments. If you have questions, please call your primary care clinic.  If you do not have a primary care provider, please call [phone] and they will assist you.        Care EveryWhere ID     This is your Care EveryWhere ID. This could be used by other organizations to access your [place] medical records  [id number]        Your Vitals Were     Pulse Temperature Respirations Height BMI (Body Mass Index)       56 98.2  F (36.8  C) (Oral) 20 1.702 m (5' 7\") 25.47 kg/m2        Blood Pressure from Last 3 Encounters:   09/07/18 122/72   07/31/17 100/80   07/19/17 104/72    Weight from Last 3 Encounters:   09/07/18 73.8 kg (162 lb 9.6 oz)   07/31/17 72.1 kg (159 lb)   07/19/17 70.6 kg (155 lb 9.6 oz)              We Performed the Following     Glucose Fasting (BFP)     Lipid Profile     VENOUS COLLECTION          Where to get your medicines      These medications were sent to [facility] [id number] [place], [place] - [address]  [address], [place] [address]     " Phone:  [phone]     finasteride 5 MG tablet    propranolol 20 MG tablet          Primary Care Provider Office Phone # Fax #    ANDIE Clarke [phone] [address]  [facility] [address]        Equal Access to Services     MAGDIELBanner ABEL : Hadii aad ku hadasho Soomaali, waaxda luqadaha, qaybta kaalmada adeegyada, waxay idiin hayaan adeeg khilene lacesar wheeler. So [facility] [phone].    ATENCIÓN: Si habla español, tiene a frazier disposición servicios gratuitos de asistencia lingüística. Victoriano al [phone].    We comply with applicable federal civil rights laws and [place] laws. We do not discriminate on the basis of race, color, national origin, age, disability, sex, sexual orientation, or gender identity.            Thank you!     Thank you for choosing [facility]  for your care. Our goal is always to provide you with excellent care. Hearing back from our patients is one way we can continue to improve our services. Please take a few minutes to complete the written survey that you may receive in the mail after your visit with us. Thank you!             Your Updated Medication List - Protect others around you: Learn how to safely use, store and throw away your medicines at www.disposemymeds.org.          This list is accurate as of 9/7/18  9:01 AM.  Always use your most recent med list.                   Brand Name Dispense Instructions for use Diagnosis    finasteride 5 MG tablet    PROSCAR    30 tablet    TAKE ONE-FOURTH TABLET BY MOUTH DAILY DAILY    Alopecia       propranolol 20 MG tablet    INDERAL    90 tablet    TAKE 1 TABLET BY MOUTH DAILY. TAKE FOR PERFORMANCE ANXIETY AS NEEDED    Anxiety state

## 2018-09-08 LAB
CHOLEST SERPL-MCNC: 257 MG/DL
CHOLEST/HDLC SERPL: 5.4 (CALC)
HDLC SERPL-MCNC: 48 MG/DL
LDLC SERPL CALC-MCNC: 185 MG/DL (CALC)
NONHDLC SERPL-MCNC: 209 MG/DL (CALC)
TRIGL SERPL-MCNC: 117 MG/DL

## 2018-09-08 ASSESSMENT — ANXIETY QUESTIONNAIRES: GAD7 TOTAL SCORE: 0

## 2018-10-23 DIAGNOSIS — L65.9 ALOPECIA: ICD-10-CM

## 2018-10-23 RX ORDER — FINASTERIDE 5 MG/1
TABLET, FILM COATED ORAL
Qty: 8 TABLET | Refills: 0 | OUTPATIENT
Start: 2018-10-23

## 2018-10-23 NOTE — TELEPHONE ENCOUNTER
Refused Prescriptions:                       Disp   Refills    finasteride (PROSCAR) 5 MG tablet [Pharmac*8 tabl*0        Sig: TAKE ONE-FOURTH TABLET BY MOUTH ONCE DAILY  Refused By: LOLA MULLINS  Reason for Refusal: Request already responded to by other means (phone, fax, etc.)  Reason for Refusal Comment: refilled 9-7-2018     refilled on 9-7-2018 at time of px  Eves  [phone] (home) NONE (work)     no

## 2018-10-28 DIAGNOSIS — L65.9 ALOPECIA: ICD-10-CM

## 2018-10-29 RX ORDER — FINASTERIDE 5 MG/1
TABLET, FILM COATED ORAL
Qty: 8 TABLET | OUTPATIENT
Start: 2018-10-29

## 2018-10-29 NOTE — TELEPHONE ENCOUNTER
Refused Prescriptions:                       Disp   Refills    finasteride (PROSCAR) 5 MG tablet [Pharmac*8 tabl*         Sig: TAKE ONE-FOURTH TABLET BY MOUTH ONCE DAILY  Refused By: LLOA MULLINS  Reason for Refusal: Request already responded to by other means (phone, fax, etc.)  Reason for Refusal Comment: refilled 9-2018 for 4 months pt due for ov in Jan    Estefany  [phone] (home) NONE (work)

## 2019-03-03 DIAGNOSIS — F41.1 ANXIETY STATE: ICD-10-CM

## 2019-03-04 RX ORDER — PROPRANOLOL HYDROCHLORIDE 20 MG/1
TABLET ORAL
Qty: 90 TABLET | Refills: 1 | Status: SHIPPED | OUTPATIENT
Start: 2019-03-04 | End: 2019-09-11

## 2019-03-04 NOTE — TELEPHONE ENCOUNTER
Gerber Jackson is requesting a refill of:    Pending Prescriptions:                       Disp   Refills    propranolol (INDERAL) 20 MG tablet [Pharm*90 tab*1            Sig: TAKE 1 TABLET BY MOUTH DAILY. TAKE FOR           PERFORMANCE ANXIETY AS NEEDED    Last OV was 9/7/18, follow up in 1 year    ThanksLeann

## 2019-09-11 DIAGNOSIS — F41.1 ANXIETY STATE: ICD-10-CM

## 2019-09-11 RX ORDER — PROPRANOLOL HYDROCHLORIDE 20 MG/1
TABLET ORAL
Qty: 30 TABLET | Refills: 0 | COMMUNITY
Start: 2019-09-11 | End: 2019-10-08

## 2019-09-11 NOTE — TELEPHONE ENCOUNTER
Called pt in 30 day of Propranolol. Pt due for FASTING CPX or OV. Please call to schedule.    Thanks, Pham

## 2019-09-27 ENCOUNTER — HEALTH MAINTENANCE LETTER (OUTPATIENT)
Age: 44
End: 2019-09-27

## 2019-10-08 DIAGNOSIS — F41.1 ANXIETY STATE: ICD-10-CM

## 2019-10-08 RX ORDER — PROPRANOLOL HYDROCHLORIDE 20 MG/1
TABLET ORAL
Qty: 30 TABLET | Refills: 0 | Status: SHIPPED | OUTPATIENT
Start: 2019-10-08 | End: 2019-10-25

## 2019-10-08 NOTE — TELEPHONE ENCOUNTER
Pending Prescriptions:                       Disp   Refills    propranolol (INDERAL) 20 MG tablet [Pharm*30 tab*0            Sig: TAKE 1 TABLET BY MOUTH EVERY DAY    30 day given on 9-11-19  CPX on 10-26  Okay for another 30?  Fax or deny and close encounter  Eufemia  [phone] (home) NONE (work)

## 2019-10-16 DIAGNOSIS — L65.9 ALOPECIA: ICD-10-CM

## 2019-10-16 NOTE — TELEPHONE ENCOUNTER
Gerber Jackson is requesting a refill of:    Pending Prescriptions:                       Disp   Refills    finasteride (PROSCAR) 5 MG tablet [Pharma*7 tabl*0            Sig: TAKE 1/4 TABLET BY MOUTH DAILY    Pt has OV on 10/25/19

## 2019-10-17 RX ORDER — FINASTERIDE 5 MG/1
TABLET, FILM COATED ORAL
Qty: 7 TABLET | Refills: 0 | Status: SHIPPED | OUTPATIENT
Start: 2019-10-17 | End: 2019-10-25

## 2019-10-25 ENCOUNTER — OFFICE VISIT (OUTPATIENT)
Dept: FAMILY MEDICINE | Facility: CLINIC | Age: 44
End: 2019-10-25

## 2019-10-25 VITALS
OXYGEN SATURATION: 97 % | WEIGHT: 158 LBS | DIASTOLIC BLOOD PRESSURE: 68 MMHG | HEIGHT: 67 IN | TEMPERATURE: 97.1 F | SYSTOLIC BLOOD PRESSURE: 108 MMHG | HEART RATE: 50 BPM | BODY MASS INDEX: 24.8 KG/M2

## 2019-10-25 DIAGNOSIS — A63.0 PENILE WART: ICD-10-CM

## 2019-10-25 DIAGNOSIS — E78.00 PURE HYPERCHOLESTEROLEMIA: ICD-10-CM

## 2019-10-25 DIAGNOSIS — B07.8 COMMON WART: ICD-10-CM

## 2019-10-25 DIAGNOSIS — L65.9 ALOPECIA: ICD-10-CM

## 2019-10-25 DIAGNOSIS — F41.1 ANXIETY STATE: ICD-10-CM

## 2019-10-25 DIAGNOSIS — Z00.00 ROUTINE GENERAL MEDICAL EXAMINATION AT A HEALTH CARE FACILITY: Primary | ICD-10-CM

## 2019-10-25 LAB
CHOLEST SERPL-MCNC: 238 MG/DL (ref 0–199)
CHOLEST/HDLC SERPL: 5 {RATIO} (ref 0–5)
GLUCOSE SERPL-MCNC: 82 MG/DL (ref 60–99)
HDLC SERPL-MCNC: 51 MG/DL (ref 40–150)
LDLC SERPL CALC-MCNC: 164 MG/DL (ref 0–130)
TRIGL SERPL-MCNC: 116 MG/DL (ref 0–149)

## 2019-10-25 PROCEDURE — 80061 LIPID PANEL: CPT | Performed by: PHYSICIAN ASSISTANT

## 2019-10-25 PROCEDURE — 36415 COLL VENOUS BLD VENIPUNCTURE: CPT | Performed by: PHYSICIAN ASSISTANT

## 2019-10-25 PROCEDURE — 82947 ASSAY GLUCOSE BLOOD QUANT: CPT | Performed by: PHYSICIAN ASSISTANT

## 2019-10-25 PROCEDURE — 99396 PREV VISIT EST AGE 40-64: CPT | Performed by: PHYSICIAN ASSISTANT

## 2019-10-25 RX ORDER — PROPRANOLOL HYDROCHLORIDE 20 MG/1
TABLET ORAL
Qty: 90 TABLET | Refills: 1 | Status: SHIPPED | OUTPATIENT
Start: 2019-10-25 | End: 2023-09-19

## 2019-10-25 RX ORDER — FINASTERIDE 5 MG/1
TABLET, FILM COATED ORAL
Qty: 90 TABLET | Refills: 1 | Status: SHIPPED | OUTPATIENT
Start: 2019-10-25 | End: 2020-12-22

## 2019-10-25 SDOH — HEALTH STABILITY: MENTAL HEALTH: HOW OFTEN DO YOU HAVE A DRINK CONTAINING ALCOHOL?: 4 OR MORE TIMES A WEEK

## 2019-10-25 SDOH — HEALTH STABILITY: MENTAL HEALTH: HOW MANY STANDARD DRINKS CONTAINING ALCOHOL DO YOU HAVE ON A TYPICAL DAY?: 1 OR 2

## 2019-10-25 ASSESSMENT — MIFFLIN-ST. JEOR: SCORE: 1579.27

## 2019-10-25 NOTE — PROGRESS NOTES
SUBJECTIVE:   CC: Gerber Jackson is an 44 year old male who presents for preventive health visit.     Healthy Habits:    Do you get at least three servings of calcium containing foods daily (dairy, green leafy vegetables, etc.)? yes    Amount of exercise or daily activities, outside of work: working out 5 days a week    Problems taking medications regularly No    Medication side effects: No    Have you had an eye exam in the past two years? yes    Do you see a dentist twice per year? yes    Do you have sleep apnea, excessive snoring or daytime drowsiness?no      Anxiety Follow-Up    How are you doing with your anxiety since your last visit? No change    Are you having other symptoms that might be associated with anxiety? No    Have you had a significant life event? No     Are you feeling depressed? No    Do you have any concerns with your use of alcohol or other drugs? No     Taking propranol now once a week.     The 10-year ASCVD risk score (Mika LOVETT Jr., et al., 2013) is: 1.8%    Values used to calculate the score:      Age: 44 years      Sex: Male      Is Non- : No      Diabetic: No      Tobacco smoker: No      Systolic Blood Pressure: 108 mmHg      Is BP treated: No      HDL Cholesterol: 48 mg/dL      Total Cholesterol: 257 mg/dL      Propecia - Proscar    Ate a couple mints today     Brother taking [facility] from [facility] [place]  - bilateral ear prosthesis     Upper Chest spot 3 months  Slightly itchy  Not painful.  No OTC tx    Penile HPV  Bumps   Wife has hx HPV      HM due:   Influenza vaccine - will get at work  HIV screening - blood donation 10 years ago    Social History     Tobacco Use     Smoking status: Never Smoker     Smokeless tobacco: Never Used   Substance Use Topics     Alcohol use: Yes     Alcohol/week: 7.0 standard drinks     Types: 7 Standard drinks or equivalent per week     Frequency: 4 or more times a week     Drinks per session: 1 or 2     Drug  use: No     HIRAM-7 SCORE 7/19/2017 9/7/2018   Total Score 6 0     PHQ 7/19/2017   PHQ-9 Total Score 1   Q9: Thoughts of better off dead/self-harm past 2 weeks Not at all     No flowsheet data found.  No flowsheet data found.      Today's PHQ-2 Score:   PHQ-2 ( 1999 Pfizer) 10/25/2019 7/19/2017   Q1: Little interest or pleasure in doing things 0 0   Q2: Feeling down, depressed or hopeless 0 0   PHQ-2 Score 0 0       Abuse: Current or Past(Physical, Sexual or Emotional)- No  Do you feel safe in your environment? Yes    Social History     Tobacco Use     Smoking status: Never Smoker     Smokeless tobacco: Never Used   Substance Use Topics     Alcohol use: Yes     Alcohol/week: 7.0 standard drinks     Types: 7 Standard drinks or equivalent per week     Frequency: 4 or more times a week     Drinks per session: 1 or 2     If you drink alcohol do you typically have >3 drinks per day or >7 drinks per week? No                      Last PSA: No results found for: PSA    Reviewed orders with patient. 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     Anxiety state     Alopecia     Health Care Home     Bradycardia     Pure hypercholesterolemia     Past Surgical History:   Procedure Laterality Date     C REVISE STAPEDECTOMY/STAPEDOTOMY       C REVISE STAPEDECTOMY/STAPEDOTOMY       C STAPEDECTOMY,FOOTPLATE DRILL OUT      dr jocelyne macias      CREATE EARDRUM OPENING,LOCAL ANESTH      Tympanostomy     CREATE EARDRUM OPENING,LOCAL ANESTH      Tympanostomy     HC EXCISION OF CHOLESTEATOMA, MIDDLE EAR  2015    right , [facility]      LASIK  2017     STAPEDECTOMY   2015    [facility]        Social History     Tobacco Use     Smoking status: Never Smoker     Smokeless tobacco: Never Used   Substance Use Topics     Alcohol use: Yes     Alcohol/week: 7.0 standard drinks     Types: 7 Standard drinks or equivalent per week     Frequency: 4 or more times a week     Drinks per session: 1 or 2     Family History   Problem Relation Age of Onset     Lipids Father      Hypertension Father      Cerebrovascular Disease Maternal Grandfather      Alcoholism Brother         sober     Substance Abuse Brother         drug abuse - Clean since 35     Hyperlipidemia Brother      No Known Problems Mother      No Known Problems Son      No Known Problems Daughter      No Known Problems Daughter      Alcoholism Paternal Grandfather      Obesity Paternal Uncle      Coronary Artery Disease Paternal Uncle          Current Outpatient Medications   Medication Sig Dispense Refill     finasteride (PROSCAR) 5 MG tablet Take 1/4 tablet daily 90 tablet 1     propranolol (INDERAL) 20 MG tablet Take 1/2-1 tablet as needed for anxiety up to two times a day 90 tablet 1     Allergies   Allergen Reactions     No Known Drug Allergies      Recent Labs   Lab Test 09/07/18  0907 07/19/17  0947 05/20/16  0952  08/31/11   * 167* 132*   < > 199*   HDL 48 57 55   < > 53   TRIG 117 88 77   < > 144   ALT  --   --  17  --  15   CR  --   --  1.01  --  1.05   GFRESTIMATED  --   --  94  --  92   POTASSIUM  --   --  4.2  --  4.2   TSH  --   --   --   --  3.05    < > = values in this interval not displayed.        Reviewed and updated as needed this visit by clinical staff  Tobacco  Allergies  Meds  Problems  Soc Hx        Reviewed and updated as needed this visit by Provider        No past medical history on file.   Past Surgical History:   Procedure Laterality Date     C REVISE STAPEDECTOMY/STAPEDOTOMY       C REVISE STAPEDECTOMY/STAPEDOTOMY       C STAPEDECTOMY,FOOTPLATE DRILL OUT      dr jocelyne macias      CREATE  "EARDRUM OPENING,LOCAL ANESTH      Tympanostomy     CREATE EARDRUM OPENING,LOCAL ANESTH      Tympanostomy     HC EXCISION OF CHOLESTEATOMA, MIDDLE EAR  2015    right , [facility]      LASIK  2017     STAPEDECTOMY  2015    [facility]        ROS:  CONSTITUTIONAL: NEGATIVE for fever, chills, change in weight  EYES: NEGATIVE for vision changes or irritation  ENT: NEGATIVE for ear, mouth and throat problems  RESP: NEGATIVE for significant cough or SOB  CV: NEGATIVE for chest pain, palpitations or peripheral edema  GI: NEGATIVE for nausea, abdominal pain, heartburn, or change in bowel habits   male: negative for dysuria, hematuria, decreased urinary stream, erectile dysfunction, urethral discharge  MUSCULOSKELETAL: NEGATIVE for significant arthralgias or myalgia  NEURO: NEGATIVE for weakness, dizziness or paresthesias  PSYCHIATRIC: NEGATIVE for changes in mood or affect    OBJECTIVE:   /68 (BP Location: Right arm, Patient Position: Sitting, Cuff Size: Adult Large)   Pulse 50   Temp 97.1  F (36.2  C) (Temporal)   Ht 1.708 m (5' 7.25\")   Wt 71.7 kg (158 lb)   SpO2 97%   BMI 24.56 kg/m       EXAM:  GENERAL: healthy, alert and no distress  EYES: Eyes grossly normal to inspection, PERRL and conjunctivae and sclerae normal  HENT: ear canals and TM's normal, nose and mouth without ulcers or lesions  NECK: no adenopathy, no asymmetry, masses, or scars and thyroid normal to palpation  RESP: lungs clear to auscultation - no rales, rhonchi or wheezes  CV: regular rate and rhythm, normal S1 S2, no S3 or S4, no murmur, click or rub, no peripheral edema and peripheral pulses strong  ABDOMEN: soft, nontender, no hepatosplenomegaly, no masses and bowel sounds normal   (male): normal male genitalia without urethral discharge, no hernia  MS: no gross musculoskeletal defects noted, no edema  SKIN: upper left chest 3 mm macule - brown with hyperkeratosis - cleansed with alcohol LN x3  Penile papules x 3 - also cleansed with " "alcohol and LN x 3  NEURO: Normal strength and tone, mentation intact and speech normal  PSYCH: mentation appears normal, affect normal/bright    Diagnostic Test Results:  Labs reviewed in Epic    ASSESSMENT/PLAN:   1. Routine general medical examination at a health care facility    - VENOUS COLLECTION  - Glucose Fasting (BFP)  - Lipid Panel (BFP)    2. Anxiety state    - propranolol (INDERAL) 20 MG tablet; Take 1/2-1 tablet as needed for anxiety up to two times a day  Dispense: 90 tablet; Refill: 1    3. Pure hypercholesterolemia    - VENOUS COLLECTION  - Lipid Panel (BFP)    4. Alopecia    - finasteride (PROSCAR) 5 MG tablet; Take 1/4 tablet daily  Dispense: 90 tablet; Refill: 1    5. Penile wart  RTC for re treatment if not resolved    6. Common wart  Schedule 30 min appt 4 weeks if not gone for bx    Advised to watch for signs of infection with wart treatments      COUNSELING:  Special attention given to:        Regular exercise       Healthy diet/nutrition       Advance Care Planning    Estimated body mass index is 24.56 kg/m  as calculated from the following:    Height as of this encounter: 1.708 m (5' 7.25\").    Weight as of this encounter: 71.7 kg (158 lb).         reports that he has never smoked. He has never used smokeless tobacco.      Counseling Resources:  ATP IV Guidelines  Pooled Cohorts Equation Calculator  FRAX Risk Assessment  ICSI Preventive Guidelines  Dietary Guidelines for Americans, 2010  USDA's MyPlate  ASA Prophylaxis  Lung CA Screening    ANDIE Clarke  [facility]  "

## 2019-10-25 NOTE — NURSING NOTE
Gerber is here for a fasting CPX.          Patient is here for a full physical exam.    Pre-Visit Screening :  Immunizations : up to date  Colon Screening : NA  Mammogram: NA  Asthma Action Test/Plan : NA  PHQ9 :  None  GAD7 :  None  Patient's  BMI Body mass index is 24.56 kg/m .  Questioned patient about current smoking habits.  Pt. has never smoked.  OK to leave a detailed voice message regarding today's visit Yes, phone # [phone]      ETOH screening:  Questions:  1-How often do you have a drink containing alcohol?                             3 times per week(s)  2-How many drinks containing alcohol do you have on a typical day when you are         Drinking?                              1 and 2   3- How often do you have 5 or more drinks on one occasion?                              never

## 2019-10-31 DIAGNOSIS — F41.1 ANXIETY STATE: ICD-10-CM

## 2019-10-31 RX ORDER — PROPRANOLOL HYDROCHLORIDE 20 MG/1
TABLET ORAL
Qty: 30 TABLET | Refills: 0 | OUTPATIENT
Start: 2019-10-31

## 2019-10-31 NOTE — TELEPHONE ENCOUNTER
Refused Prescriptions:                       Disp   Refills    propranolol (INDERAL) 20 MG tablet [Pharma*30 tab*0        Sig: TAKE 1 TABLET BY MOUTH EVERY DAY  Refused By: EUFEMIA MULLINS  Reason for Refusal: Request already responded to by other means (phone, fax, etc.)  Reason for Refusal Comment: refilled 10- for six month    Eufemia  [phone] (home) NONE (work)

## 2020-12-17 DIAGNOSIS — L65.9 ALOPECIA: ICD-10-CM

## 2020-12-17 RX ORDER — FINASTERIDE 5 MG/1
TABLET, FILM COATED ORAL
Qty: 23 TABLET | Refills: 23 | COMMUNITY
Start: 2020-12-17

## 2020-12-17 NOTE — TELEPHONE ENCOUNTER
Last OV 10/25/2019. Needs OV.    Refused Prescriptions:                       Disp   Refills    finasteride (PROSCAR) 5 MG tablet [Pharmac*23 tab*23       Sig: TAKE 1/4 TABLET BY MOUTH DAILY  Refused By: ZEHRA GOLD  Reason for Refusal: Patient needs appointment

## 2020-12-22 RX ORDER — FINASTERIDE 5 MG/1
TABLET, FILM COATED ORAL
Qty: 6 TABLET | Refills: 0 | COMMUNITY
Start: 2020-12-22 | End: 2021-01-12

## 2020-12-22 NOTE — TELEPHONE ENCOUNTER
Pt scheduled appt for 01/12/21. Called in 6 tablets of Finasteride 5MG to [facility] [place] per his request.

## 2021-01-06 DIAGNOSIS — L65.9 ALOPECIA: ICD-10-CM

## 2021-01-06 RX ORDER — FINASTERIDE 5 MG/1
TABLET, FILM COATED ORAL
Qty: 6 TABLET | Refills: 0 | COMMUNITY
Start: 2021-01-06

## 2021-01-06 NOTE — TELEPHONE ENCOUNTER
Gerber Jackson is requesting a refill of:    Refused Prescriptions:                       Disp   Refills    finasteride (PROSCAR) 5 MG tablet [Pharmac*6 tabl*0        Sig: TAKE 1/4 OF TABLET BY MOUTH DAILY  Refused By: PAKO OLIVERA  Reason for Refusal: Patient needs appointment

## 2021-01-09 ENCOUNTER — HEALTH MAINTENANCE LETTER (OUTPATIENT)
Age: 46
End: 2021-01-09

## 2021-01-11 NOTE — PROGRESS NOTES
3  SUBJECTIVE:   CC: Gerber Jackson is an 45 year old male who presents for preventive health visit.       Patient has been advised of split billing requirements and indicates understanding: Yes     Healthy Habits:    Do you get at least three servings of calcium containing foods daily (dairy, green leafy vegetables, etc.)? yes    Amount of exercise or daily activities, outside of work: weights, gym MWF    Problems taking medications regularly No    Medication side effects: No    Have you had an eye exam in the past two years? yes    Do you see a dentist twice per year? No        Wt Readings from Last 5 Encounters:   01/12/21 73.8 kg (162 lb 12.8 oz)   10/25/19 71.7 kg (158 lb)   09/07/18 73.8 kg (162 lb 9.6 oz)   07/31/17 72.1 kg (159 lb)   07/19/17 70.6 kg (155 lb 9.6 oz)       Plantar fasciitis - Saw ortho  Left foot  Injection      Anxiety - controlled    Alopecia - no AE    Blood donation in his late 20s, most likely was screened for Hep C at that time. Declines testing, low risk.      Both kids at home for school  Mother and pt working from home          Today's PHQ-2 Score:   PHQ-2 ( 1999 Pfizer) 1/12/2021 10/25/2019   Q1: Little interest or pleasure in doing things 0 0   Q2: Feeling down, depressed or hopeless 0 0   PHQ-2 Score 0 0       Do you feel safe in your environment? Yes        Social History     Tobacco Use     Smoking status: Never Smoker     Smokeless tobacco: Never Used   Substance Use Topics     Alcohol use: Yes     Alcohol/week: 7.0 standard drinks     Types: 7 Standard drinks or equivalent per week     Frequency: 4 or more times a week     Drinks per session: 1 or 2     If you drink alcohol do you typically have >3 drinks per day or >7 drinks per week? No                      Last PSA: No results found for: PSA    Reviewed orders with patient. 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      LASIK  2017     STAPEDECTOMY  2015    [facility]        Social History     Tobacco Use     Smoking status: Never Smoker     Smokeless tobacco: Never Used   Substance Use Topics     Alcohol use: Yes     Alcohol/week: 7.0 standard drinks     Types: 7 Standard drinks or equivalent per week     Frequency: 4 or more times a week     Drinks per session: 1 or 2     Family History   Problem Relation Age of Onset     Lipids Father      Hypertension Father      Cerebrovascular Disease Maternal Grandfather      Alcoholism Brother         sober     Substance Abuse Brother         drug abuse - Clean since 35     Hyperlipidemia Brother      No Known Problems Mother      No Known Problems Son      No Known Problems Daughter      No Known Problems Daughter      Alcoholism Paternal Grandfather      Obesity Paternal Uncle      Coronary Artery Disease Paternal Uncle          Current Outpatient Medications   Medication Sig Dispense Refill     finasteride (PROSCAR) 5 MG tablet Take 1/4 tablet daily 30 tablet 3     propranolol (INDERAL) 20 MG tablet Take 1/2-1 tablet as needed for anxiety up to two times a day 90 tablet 1     Allergies    Allergen Reactions     No Known Drug Allergies      Recent Labs   Lab Test 01/12/21 10/25/19 09/07/18  0907 05/20/16  0952 05/20/16  0952   * 164* 185*   < > 132*   HDL 56 51 48   < > 55   TRIG 80 116 117   < > 77   ALT  --   --   --   --  17   CR  --   --   --   --  1.01   GFRESTIMATED  --   --   --   --  94   POTASSIUM  --   --   --   --  4.2    < > = values in this interval not displayed.        Reviewed and updated as needed this visit by clinical staff  Tobacco  Allergies  Meds  Problems  Med Hx  Surg Hx  Fam Hx          Reviewed and updated as needed this visit by Provider                History reviewed. No pertinent past medical history.   Past Surgical History:   Procedure Laterality Date     C REVISE STAPEDECTOMY/STAPEDOTOMY       C REVISE STAPEDECTOMY/STAPEDOTOMY       C STAPEDECTOMY,FOOTPLATE DRILL OUT      dr jocelyne macias      CREATE EARDRUM OPENING,LOCAL ANESTH      Tympanostomy     CREATE EARDRUM OPENING,LOCAL ANESTH      Tympanostomy     HC EXCISION OF CHOLESTEATOMA, MIDDLE EAR  2015    right , [facility]      LASIK  2017     STAPEDECTOMY  2015    [facility]        ROS:  CONSTITUTIONAL: NEGATIVE for fever, chills, change in weight  INTEGUMENTARY/SKIN: NEGATIVE for worrisome rashes, moles or lesions  EYES: NEGATIVE for vision changes or irritation  ENT: NEGATIVE for ear, mouth and throat problems  RESP: NEGATIVE for significant cough or SOB  CV: NEGATIVE for chest pain, palpitations or peripheral edema  GI: NEGATIVE for nausea, abdominal pain, heartburn, or change in bowel habits   male: negative for dysuria, hematuria, decreased urinary stream, erectile dysfunction, urethral discharge  MUSCULOSKELETAL: NEGATIVE for significant arthralgias or myalgia  NEURO: NEGATIVE for weakness, dizziness or paresthesias  PSYCHIATRIC: NEGATIVE for changes in mood or affect    OBJECTIVE:   /60 (BP Location: Right arm, Patient Position: Sitting, Cuff Size: Adult Large)   Pulse 56   Temp  "97.5  F (36.4  C) (Oral)   Ht 1.708 m (5' 7.25\")   Wt 73.8 kg (162 lb 12.8 oz)   SpO2 98%   BMI 25.31 kg/m       EXAM:  GENERAL: healthy, alert and no distress  EYES: Eyes grossly normal to inspection, PERRL and conjunctivae and sclerae normal  HENT: widened canals with scars present. TMs appear normal.  nose and mouth without ulcers or lesions  NECK: no adenopathy, no asymmetry, masses, or scars and thyroid normal to palpation  RESP: lungs clear to auscultation - no rales, rhonchi or wheezes  CV: regular rate and rhythm, normal S1 S2, no S3 or S4, no murmur, click or rub, no peripheral edema and peripheral pulses strong  ABDOMEN: soft, nontender, no hepatosplenomegaly, no masses and bowel sounds normal  MS: no gross musculoskeletal defects noted, no edema  SKIN: no suspicious lesions or rashes  NEURO: Normal strength and tone, mentation intact and speech normal  PSYCH: mentation appears normal, affect normal/bright    Diagnostic Test Results:  Labs reviewed in Epic  No results found for this or any previous visit (from the past 24 hour(s)).  Results for orders placed or performed in visit on 01/12/21   Lipid Panel (BFP)     Status: Abnormal   Result Value Ref Range    Cholesterol 247 (A) 0 - 199 mg/dL    Triglycerides 80 0 - 149 mg/dL    HDL Cholesterol 56 40 - 150 mg/dL    LDL Cholesterol Direct 175 (A) 0 - 130 mg/dL    Cholesterol/HDL Ratio 4 0 - 5   Glucose Fasting (BFP)     Status: None   Result Value Ref Range    Glucose 81 60 - 99 mg/dL       ASSESSMENT/PLAN:   1. Routine general medical examination at a health care facility    - VENOUS COLLECTION  - Lipid Panel (BFP)  - Glucose Fasting (BFP)    2. Alopecia    - finasteride (PROSCAR) 5 MG tablet; Take 1/4 tablet daily  Dispense: 30 tablet; Refill: 3    3. Overweight (BMI 25.0-29.9)    - VENOUS COLLECTION  - Lipid Panel (BFP)  - Glucose Fasting (BFP)    4. Pure hypercholesterolemia      5. Bradycardia      6. Anxiety state      7. 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    Weight as of this encounter: 73.8 kg (162 lb 12.8 oz).    Weight management plan: Discussed healthy diet and exercise guidelines    He reports that he has never smoked. He has never used smokeless tobacco.      Counseling Resources:  ATP IV Guidelines  Pooled Cohorts Equation Calculator  FRAX Risk Assessment  ICSI Preventive Guidelines  Dietary Guidelines for Americans, 2010  USDA's MyPlate  ASA Prophylaxis  Lung CA Screening    ANDIE Clarke  [facility]  "

## 2021-01-12 ENCOUNTER — OFFICE VISIT (OUTPATIENT)
Dept: FAMILY MEDICINE | Facility: CLINIC | Age: 46
End: 2021-01-12

## 2021-01-12 VITALS
BODY MASS INDEX: 25.55 KG/M2 | HEART RATE: 56 BPM | TEMPERATURE: 97.5 F | SYSTOLIC BLOOD PRESSURE: 102 MMHG | OXYGEN SATURATION: 98 % | DIASTOLIC BLOOD PRESSURE: 60 MMHG | HEIGHT: 67 IN | WEIGHT: 162.8 LBS

## 2021-01-12 DIAGNOSIS — F41.1 ANXIETY STATE: ICD-10-CM

## 2021-01-12 DIAGNOSIS — Z00.00 ROUTINE GENERAL MEDICAL EXAMINATION AT A HEALTH CARE FACILITY: Primary | ICD-10-CM

## 2021-01-12 DIAGNOSIS — E78.00 PURE HYPERCHOLESTEROLEMIA: ICD-10-CM

## 2021-01-12 DIAGNOSIS — H74.13: ICD-10-CM

## 2021-01-12 DIAGNOSIS — R00.1 BRADYCARDIA: ICD-10-CM

## 2021-01-12 DIAGNOSIS — E66.3 OVERWEIGHT (BMI 25.0-29.9): ICD-10-CM

## 2021-01-12 DIAGNOSIS — L65.9 ALOPECIA: ICD-10-CM

## 2021-01-12 LAB
CHOLEST SERPL-MCNC: 247 MG/DL (ref 0–199)
CHOLEST/HDLC SERPL: 4 {RATIO} (ref 0–5)
GLUCOSE SERPL-MCNC: 81 MG/DL (ref 60–99)
HDLC SERPL-MCNC: 56 MG/DL (ref 40–150)
LDLC SERPL CALC-MCNC: 175 MG/DL (ref 0–130)
TRIGL SERPL-MCNC: 80 MG/DL (ref 0–149)

## 2021-01-12 PROCEDURE — 99396 PREV VISIT EST AGE 40-64: CPT | Performed by: PHYSICIAN ASSISTANT

## 2021-01-12 PROCEDURE — 82947 ASSAY GLUCOSE BLOOD QUANT: CPT | Performed by: PHYSICIAN ASSISTANT

## 2021-01-12 PROCEDURE — 36415 COLL VENOUS BLD VENIPUNCTURE: CPT | Performed by: PHYSICIAN ASSISTANT

## 2021-01-12 PROCEDURE — 80061 LIPID PANEL: CPT | Performed by: PHYSICIAN ASSISTANT

## 2021-01-12 RX ORDER — FINASTERIDE 5 MG/1
TABLET, FILM COATED ORAL
Qty: 30 TABLET | Refills: 3 | Status: SHIPPED | OUTPATIENT
Start: 2021-01-12 | End: 2022-03-15

## 2021-01-12 ASSESSMENT — ANXIETY QUESTIONNAIRES
6. BECOMING EASILY ANNOYED OR IRRITABLE: NOT AT ALL
7. FEELING AFRAID AS IF SOMETHING AWFUL MIGHT HAPPEN: NOT AT ALL
3. WORRYING TOO MUCH ABOUT DIFFERENT THINGS: NOT AT ALL
IF YOU CHECKED OFF ANY PROBLEMS ON THIS QUESTIONNAIRE, HOW DIFFICULT HAVE THESE PROBLEMS MADE IT FOR YOU TO DO YOUR WORK, TAKE CARE OF THINGS AT HOME, OR GET ALONG WITH OTHER PEOPLE: NOT DIFFICULT AT ALL
GAD7 TOTAL SCORE: 0
1. FEELING NERVOUS, ANXIOUS, OR ON EDGE: NOT AT ALL
2. NOT BEING ABLE TO STOP OR CONTROL WORRYING: NOT AT ALL
5. BEING SO RESTLESS THAT IT IS HARD TO SIT STILL: NOT AT ALL

## 2021-01-12 ASSESSMENT — PATIENT HEALTH QUESTIONNAIRE - PHQ9
5. POOR APPETITE OR OVEREATING: NOT AT ALL
SUM OF ALL RESPONSES TO PHQ QUESTIONS 1-9: 1

## 2021-01-12 ASSESSMENT — MIFFLIN-ST. JEOR: SCORE: 1596.05

## 2021-01-12 NOTE — NURSING NOTE
Veto is here for a fasting CPX.    Pre-Visit Screening:  Immunizations:UTD  Colonoscopy:NA  Mammogram:NA  Asthma Action Test/Plan:NA  PHQ9:today  GAD7:today  Questioned patient about current smoking habits Pt.never smoked  OK to leave a detailed message on voice mail for today's visit yes, phone # [phone]

## 2021-01-13 PROBLEM — E66.3 OVERWEIGHT (BMI 25.0-29.9): Status: ACTIVE | Noted: 2021-01-13

## 2021-01-13 ASSESSMENT — ANXIETY QUESTIONNAIRES: GAD7 TOTAL SCORE: 0

## 2021-10-23 ENCOUNTER — HEALTH MAINTENANCE LETTER (OUTPATIENT)
Age: 46
End: 2021-10-23

## 2022-02-12 ENCOUNTER — HEALTH MAINTENANCE LETTER (OUTPATIENT)
Age: 47
End: 2022-02-12

## 2022-03-15 ENCOUNTER — OFFICE VISIT (OUTPATIENT)
Dept: FAMILY MEDICINE | Facility: CLINIC | Age: 47
End: 2022-03-15
Payer: COMMERCIAL

## 2022-03-15 VITALS
RESPIRATION RATE: 16 BRPM | BODY MASS INDEX: 26.21 KG/M2 | TEMPERATURE: 97.5 F | OXYGEN SATURATION: 98 % | HEIGHT: 67 IN | SYSTOLIC BLOOD PRESSURE: 118 MMHG | HEART RATE: 67 BPM | WEIGHT: 167 LBS | DIASTOLIC BLOOD PRESSURE: 76 MMHG

## 2022-03-15 DIAGNOSIS — E78.00 PURE HYPERCHOLESTEROLEMIA: ICD-10-CM

## 2022-03-15 DIAGNOSIS — Z00.00 ROUTINE HISTORY AND PHYSICAL EXAMINATION OF ADULT: Primary | ICD-10-CM

## 2022-03-15 DIAGNOSIS — Z11.59 NEED FOR HEPATITIS C SCREENING TEST: ICD-10-CM

## 2022-03-15 DIAGNOSIS — Z13.1 SCREENING FOR DIABETES MELLITUS: ICD-10-CM

## 2022-03-15 DIAGNOSIS — L65.9 ALOPECIA: ICD-10-CM

## 2022-03-15 PROCEDURE — 99396 PREV VISIT EST AGE 40-64: CPT | Performed by: NURSE PRACTITIONER

## 2022-03-15 PROCEDURE — 80061 LIPID PANEL: CPT | Performed by: NURSE PRACTITIONER

## 2022-03-15 PROCEDURE — 82947 ASSAY GLUCOSE BLOOD QUANT: CPT | Performed by: NURSE PRACTITIONER

## 2022-03-15 PROCEDURE — 36415 COLL VENOUS BLD VENIPUNCTURE: CPT | Performed by: NURSE PRACTITIONER

## 2022-03-15 PROCEDURE — 86803 HEPATITIS C AB TEST: CPT | Performed by: NURSE PRACTITIONER

## 2022-03-15 RX ORDER — FINASTERIDE 5 MG/1
TABLET, FILM COATED ORAL
Qty: 30 TABLET | Refills: 3 | Status: SHIPPED | OUTPATIENT
Start: 2022-03-15 | End: 2023-03-21

## 2022-03-15 ASSESSMENT — ENCOUNTER SYMPTOMS
NAUSEA: 0
PARESTHESIAS: 0
CONSTIPATION: 0
HEARTBURN: 0
CHILLS: 0
COUGH: 0
NERVOUS/ANXIOUS: 0
HEMATURIA: 0
HEADACHES: 0
DYSURIA: 0
EYE PAIN: 0
SHORTNESS OF BREATH: 0
JOINT SWELLING: 0
DIARRHEA: 0
ARTHRALGIAS: 1
DIZZINESS: 0
ABDOMINAL PAIN: 0
MYALGIAS: 0
PALPITATIONS: 0
FREQUENCY: 0
SORE THROAT: 0
FEVER: 0
WEAKNESS: 0
HEMATOCHEZIA: 0

## 2022-03-15 ASSESSMENT — PAIN SCALES - GENERAL: PAINLEVEL: NO PAIN (0)

## 2022-03-15 NOTE — PATIENT INSTRUCTIONS
Patient Education     Prevention Guidelines, Men Ages 40 to 49  Screening tests and vaccines are an important part of managing your health. A screening test is done to find possible disorders or diseases in people who don't have any symptoms. The goal is to find a disease early so lifestyle changes can be made and you can be watched more closely to reduce the risk of disease, or to detect it early enough to treat it most effectively. Screening tests are not considered diagnostic, but are used to determine if more testing is needed. Health counseling is essential, too. Below are guidelines for these, for men ages 40 to 49. Talk with your healthcare provider to make sure you re up to date on what you need.  Screening Who needs it How often   Alcohol misuse All men in this age group At routine exams   Blood pressure All men in this age group Yearly checkup if your blood pressure reading is normal  Normal blood pressure is less than 120/80 mm Hg  If your blood pressure is higher than normal, follow the advice of your healthcare provider      Depression All men in this age group At routine exams   Type 2 diabetes or prediabetes All men beginning at age 45 and men  without symptoms at any age who are overweight or obese and have 1 or more other risk factors for diabetes At least every 3 years (yearly if blood sugar has begun to rise)   Type 2 diabetes All men with prediabetes Every year   Hepatitis C Men at increased risk for infection - talk with your healthcare provider At routine exams   High cholesterol or triglycerides All men ages 35 and older, and younger men at high risk for coronary artery disease At least every 5 years   HIV All men At routine exams   Obesity All men in this age group At routine exams   Prostate cancer Starting at age 45, talk to healthcare provider about risks and benefits of digital rectal exam (VIKY) and prostate-specific antigen (PSA) screening1 At routine exams   Syphilis Men at increased  risk for infection - talk with your healthcare provider At routine exams   Tuberculosis Men at increased risk for infection - talk with your healthcare provider Check with your healthcare provider   Vision All men in this age group Every 2 to 4 years if no risk factors for eye disease2   Vaccine Who needs it How often   Chickenpox (varicella) All men in this age group who have no record of this infection or vaccine 2 doses; the second dose should be given at least 4 weeks after the first dose   Hepatitis A Men at increased risk for infection - talk with your healthcare provider 2 doses given at least 6 months apart   Hepatitis B Men at increased risk for infection - talk with your healthcare provider 3 doses over 6 months; second dose should be given 1 month after the first dose; the third dose should be given at least 2 months after the second dose and at least 4 months after the first dose   Haemophilus influenzae Type B (HIB) Men at increased risk for infection - talk with your healthcare provider 1 to 3 doses   Influenza (flu) All men in this age group Once a year   Measles, mumps, rubella (MMR) All men in this age group who have no record of these infections or vaccines 1 or 2 doses   Meningococcal Men at increased risk for infection - talk with your healthcare provider 1 or more doses   Pneumococcal conjugate vaccine (PCV13) and pneumococcal polysaccharide vaccine (PPSV23) Men at increased risk for infection - talk with your healthcare provider PCV13: 1 dose ages 19 to 65 (protects against 13 types of pneumococcal bacteria)     PPSV23: 1 to 2 doses through age 64, or 1 dose at 65 or older (protects against 23 types of pneumococcal bacteria)      Tetanus/diphtheria/  pertussis (Td/Tdap) booster All men in this age group Td every 10 years, or a one-time dose of Tdap instead of a Td booster after age 18, then Td every 10 years   Counseling Who needs it How often   Diet and exercise Men who are overweight or obese  "When diagnosed, and then at routine exams   Sexually transmitted infection prevention Men at increased risk for infection - talk with your healthcare provider At routine exams   Use of daily aspirin Men ages 45 to 79 at risk for cardiovascular health problems At routine exams   Use of tobacco and the health effects it can cause All men in this age group Every exam   [address] [place] [address] [facility]   2[facility] [facility] [employer]  Doni last reviewed this educational content on 2/1/2017 2000-2021 [employer]. All rights reserved. This information is not intended as a substitute for professional medical care. Always follow your healthcare professional's instructions.         Plantar fasciitis-  Trigger Point massage ball for plantar fasciitis  Mobility video on YouTube to follow/learn mobility for plantar fasciitis - search for \"Egoscue- plantar faciiitis\"  "

## 2022-03-15 NOTE — PROGRESS NOTES
SUBJECTIVE:   CC: Gerber Jackson is an 46 year old male who presents for preventative health visit.     Patient has been advised of split billing requirements and indicates understanding: Yes  Healthy Habits:     Getting at least 3 servings of Calcium per day:  Yes    Bi-annual eye exam:  Yes    Dental care twice a year:  Yes    Sleep apnea or symptoms of sleep apnea:  None    Diet:  Regular (no restrictions)    Frequency of exercise:  2-3 days/week    Duration of exercise:  45-60 minutes    Taking medications regularly:  Yes    Medication side effects:  None    PHQ-2 Total Score: 0    Additional concerns today:  No    Cycles healthy vs. Not so healthy.  Oatmeal daily for breakfast consistently.  Lunch used to be salads but now working from home and routine changed.    Wife is from beef [place] farm.  So he eats plenty of beef.    10 year old, 12 year old and 20 year old    Gym before work in the morning    Today's PHQ-2 Score:   PHQ-2 ( 1999 Pfizer) 3/15/2022   Q1: Little interest or pleasure in doing things 0   Q2: Feeling down, depressed or hopeless 0   PHQ-2 Score 0   PHQ-2 Total Score (12-17 Years)- Positive if 3 or more points; Administer PHQ-A if positive -   Q1: Little interest or pleasure in doing things Not at all   Q2: Feeling down, depressed or hopeless Not at all   PHQ-2 Score 0       Abuse: Current or Past(Physical, Sexual or Emotional)- NO  Do you feel safe in your environment? YES    Have you ever done Advance Care Planning? (For example, a Health Directive, POLST, or a discussion with a medical provider or your loved ones about your wishes): No, advance care planning information given to patient to review.  Patient plans to discuss their wishes with loved ones or provider.      Social History     Tobacco Use     Smoking status: Never Smoker     Smokeless tobacco: Never Used   Substance Use Topics     Alcohol use: Yes     Alcohol/week: 7.0 standard drinks     Types: 7 Standard drinks or equivalent  per week     If you drink alcohol do you typically have >3 drinks per day or >7 drinks per week? No    Alcohol Use 3/15/2022   Prescreen: >3 drinks/day or >7 drinks/week? No   Prescreen: >3 drinks/day or >7 drinks/week? -   No flowsheet data found.    Last PSA: No results found for: PSA    Reviewed orders with patient. 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     Anxiety state     Alopecia     Health Care Home     Bradycardia     Pure hypercholesterolemia     Overweight (BMI 25.0-29.9)     Past Surgical History:   Procedure Laterality Date     CREATE EARDRUM OPENING,LOCAL ANESTH      Tympanostomy     CREATE EARDRUM OPENING,LOCAL ANESTH      Tympanostomy     HC EXCISION OF CHOLESTEATOMA, MIDDLE EAR  2015    right , [facility]      LASIK  2017     STAPEDECTOMY  2015    [facility] REVISE STAPEDECTOMY/STAPEDOTOMY       [facility] REVISE STAPEDECTOMY/STAPEDOTOMY       [facility] STAPEDECTOMY,FOOTPLATE DRILL OUT      dr jocelyne macias        Social History     Tobacco Use     Smoking status: Never Smoker     Smokeless tobacco: Never Used   Substance Use Topics     Alcohol use: Yes     Alcohol/week: 7.0 standard drinks     Types: 7 Standard drinks or equivalent per week     Family History   Problem Relation Age of Onset     Lipids Father      Hypertension Father      Cerebrovascular Disease Maternal Grandfather      Alcoholism Brother         sober     Substance Abuse Brother         drug abuse - Clean since 35     Hyperlipidemia Brother      No Known Problems Mother      No Known Problems Son      No Known Problems Daughter      No Known Problems  "Daughter      Alcoholism Paternal Grandfather      Obesity Paternal Uncle      Coronary Artery Disease Paternal Uncle          Current Outpatient Medications   Medication Sig Dispense Refill     finasteride (PROSCAR) 5 MG tablet Take 1/4 tablet daily 30 tablet 3     propranolol (INDERAL) 20 MG tablet Take 1/2-1 tablet as needed for anxiety up to two times a day 90 tablet 1     Allergies   Allergen Reactions     No Known Drug Allergies        Reviewed and updated as needed this visit by clinical staff   Tobacco  Allergies  Meds   Med Hx  Surg Hx  Fam Hx          Reviewed and updated as needed this visit by Provider   Tobacco   Meds   Med Hx  Surg Hx  Fam Hx           Review of Systems   Constitutional: Negative for chills and fever.   HENT: Positive for hearing loss. Negative for congestion, ear pain and sore throat.    Eyes: Negative for pain and visual disturbance.   Respiratory: Negative for cough and shortness of breath.    Cardiovascular: Negative for chest pain, palpitations and peripheral edema.   Gastrointestinal: Negative for abdominal pain, constipation, diarrhea, heartburn, hematochezia and nausea.   Genitourinary: Negative for dysuria, frequency, genital sores, hematuria, impotence, penile discharge and urgency.   Musculoskeletal: Positive for arthralgias. Negative for joint swelling and myalgias.   Skin: Negative for rash.   Neurological: Negative for dizziness, weakness, headaches and paresthesias.   Psychiatric/Behavioral: Negative for mood changes. The patient is not nervous/anxious.        OBJECTIVE:   /76 (BP Location: Right arm)   Pulse 67   Temp 97.5  F (36.4  C) (Oral)   Resp 16   Ht 1.708 m (5' 7.25\")   Wt 75.8 kg (167 lb)   SpO2 98%   BMI 25.96 kg/m      Physical Exam  GENERAL: healthy, alert and no distress  EYES: Eyes grossly normal to inspection, PERRL and conjunctivae and sclerae normal  HENT: ear canals and TM's normal, nose and mouth without ulcers or " "lesions  NECK: no adenopathy, no asymmetry, masses, or scars and thyroid normal to palpation  RESP: lungs clear to auscultation - no rales, rhonchi or wheezes  CV: regular rate and rhythm, normal S1 S2, no S3 or S4, no murmur, click or rub, no peripheral edema and peripheral pulses strong  ABDOMEN: soft, nontender, no hepatosplenomegaly, no masses and bowel sounds normal  MS: no gross musculoskeletal defects noted, no edema  SKIN: no suspicious lesions or rashes  NEURO: Normal strength and tone, mentation intact and speech normal  PSYCH: mentation appears normal, affect normal/bright    Diagnostic Test Results:  Labs reviewed in Epic    ASSESSMENT/PLAN:   Gerber was seen today for physical.    Diagnoses and all orders for this visit:    Routine history and physical examination of adult    Need for hepatitis C screening test  -     Hepatitis C Screen Reflex to HCV RNA Quant and Genotype    Screening for diabetes mellitus  -     Glucose    Pure hypercholesterolemia  -     Lipid panel reflex to direct LDL Fasting    Alopecia  -     finasteride (PROSCAR) 5 MG tablet; Take 1/4 tablet daily    Other orders  -     REVIEW OF HEALTH MAINTENANCE PROTOCOL ORDERS    Patient Instructions\"  Plantar fasciitis-   Trigger Point massage ball for plantar fasciitis  Mobility video on YouTube to follow/learn mobility for plantar fasciitis - search for \"Egoscue- plantar faciiitis\"    COUNSELING:   Reviewed preventive health counseling, as reflected in patient instructions       Regular exercise       Healthy diet/nutrition    Estimated body mass index is 25.96 kg/m  as calculated from the following:    Height as of this encounter: 1.708 m (5' 7.25\").    Weight as of this encounter: 75.8 kg (167 lb).         He reports that he has never smoked. He has never used smokeless tobacco.      Counseling Resources:  ATP IV Guidelines  Pooled Cohorts Equation Calculator  FRAX Risk Assessment  ICSI Preventive Guidelines  Dietary Guidelines for " Americans, 2010  USDA's MyPlate  ASA Prophylaxis  Lung CA Screening    Susan Snowden NP  [facility]

## 2022-03-16 LAB
CHOLEST SERPL-MCNC: 255 MG/DL
FASTING STATUS PATIENT QL REPORTED: YES
FASTING STATUS PATIENT QL REPORTED: YES
GLUCOSE BLD-MCNC: 87 MG/DL (ref 70–99)
HCV AB SERPL QL IA: NONREACTIVE
HDLC SERPL-MCNC: 45 MG/DL
LDLC SERPL CALC-MCNC: 186 MG/DL
NONHDLC SERPL-MCNC: 210 MG/DL
TRIGL SERPL-MCNC: 122 MG/DL

## 2022-03-30 DIAGNOSIS — L65.9 ALOPECIA: ICD-10-CM

## 2022-03-30 RX ORDER — FINASTERIDE 5 MG/1
TABLET, FILM COATED ORAL
Qty: 21 TABLET | Refills: 5 | COMMUNITY
Start: 2022-03-30

## 2022-03-30 NOTE — TELEPHONE ENCOUNTER
Gerber Jackson is requesting a refill of:    Refused Prescriptions:                       Disp   Refills    finasteride (PROSCAR) 5 MG tablet [Pharmac*21 tab*5        Sig: TAKE 1/4 TABLET BY MOUTH DAILY  Refused By: PHOEBE LOYA  Reason for Refusal: Patient no longer under provider care    Pt has established at [facility]

## 2022-10-09 ENCOUNTER — HEALTH MAINTENANCE LETTER (OUTPATIENT)
Age: 47
End: 2022-10-09

## 2023-03-20 DIAGNOSIS — L65.9 ALOPECIA: ICD-10-CM

## 2023-03-21 RX ORDER — FINASTERIDE 5 MG/1
TABLET, FILM COATED ORAL
Qty: 30 TABLET | Refills: 0 | Status: SHIPPED | OUTPATIENT
Start: 2023-03-21 | End: 2023-08-22

## 2023-03-21 NOTE — TELEPHONE ENCOUNTER
Called to help patient schedule appt,    No answer VM message left to call clinic.        Yuki Burns    [facility]

## 2023-05-21 ENCOUNTER — HEALTH MAINTENANCE LETTER (OUTPATIENT)
Age: 48
End: 2023-05-21

## 2023-07-11 DIAGNOSIS — L65.9 ALOPECIA: ICD-10-CM

## 2023-07-11 RX ORDER — FINASTERIDE 5 MG/1
TABLET, FILM COATED ORAL
Qty: 7 TABLET | Refills: 4 | OUTPATIENT
Start: 2023-07-11

## 2023-08-22 DIAGNOSIS — L65.9 ALOPECIA: ICD-10-CM

## 2023-08-22 RX ORDER — FINASTERIDE 5 MG/1
TABLET, FILM COATED ORAL
Qty: 7 TABLET | Refills: 0 | Status: SHIPPED | OUTPATIENT
Start: 2023-08-22 | End: 2023-09-12

## 2023-08-22 NOTE — TELEPHONE ENCOUNTER
Routing to Susan Q    Patient is calling requesting to have medication filled    Willing to give sheyla refill until his appt 9/19      Yuki Burns    [facility]

## 2023-09-12 DIAGNOSIS — L65.9 ALOPECIA: ICD-10-CM

## 2023-09-12 RX ORDER — FINASTERIDE 5 MG/1
TABLET, FILM COATED ORAL
Qty: 7 TABLET | Refills: 0 | Status: SHIPPED | OUTPATIENT
Start: 2023-09-12 | End: 2023-09-19

## 2023-09-16 ASSESSMENT — ENCOUNTER SYMPTOMS
ABDOMINAL PAIN: 0
ARTHRALGIAS: 0
NERVOUS/ANXIOUS: 0
SHORTNESS OF BREATH: 0
HEMATOCHEZIA: 0
PARESTHESIAS: 0
PALPITATIONS: 0
FREQUENCY: 0
HEMATURIA: 0
EYE PAIN: 0
HEADACHES: 0
JOINT SWELLING: 0
WEAKNESS: 0
CHILLS: 0
DYSURIA: 0
CONSTIPATION: 0
COUGH: 0
SORE THROAT: 0
DIARRHEA: 0
DIZZINESS: 0
FEVER: 0
MYALGIAS: 0
HEARTBURN: 1

## 2023-09-19 ENCOUNTER — OFFICE VISIT (OUTPATIENT)
Dept: FAMILY MEDICINE | Facility: CLINIC | Age: 48
End: 2023-09-19
Payer: COMMERCIAL

## 2023-09-19 VITALS
SYSTOLIC BLOOD PRESSURE: 115 MMHG | DIASTOLIC BLOOD PRESSURE: 75 MMHG | TEMPERATURE: 98.1 F | HEIGHT: 68 IN | WEIGHT: 170 LBS | HEART RATE: 58 BPM | RESPIRATION RATE: 16 BRPM | OXYGEN SATURATION: 97 % | BODY MASS INDEX: 25.76 KG/M2

## 2023-09-19 DIAGNOSIS — Z12.11 SCREEN FOR COLON CANCER: ICD-10-CM

## 2023-09-19 DIAGNOSIS — Z00.00 ROUTINE GENERAL MEDICAL EXAMINATION AT A HEALTH CARE FACILITY: Primary | ICD-10-CM

## 2023-09-19 DIAGNOSIS — Z23 NEED FOR TDAP VACCINATION: ICD-10-CM

## 2023-09-19 DIAGNOSIS — L65.9 ALOPECIA: ICD-10-CM

## 2023-09-19 DIAGNOSIS — F41.1 ANXIETY STATE: ICD-10-CM

## 2023-09-19 DIAGNOSIS — E78.00 PURE HYPERCHOLESTEROLEMIA: ICD-10-CM

## 2023-09-19 LAB
ALBUMIN SERPL BCG-MCNC: 4.9 G/DL (ref 3.5–5.2)
ALP SERPL-CCNC: 70 U/L (ref 40–129)
ALT SERPL W P-5'-P-CCNC: 27 U/L (ref 0–70)
ANION GAP SERPL CALCULATED.3IONS-SCNC: 10 MMOL/L (ref 7–15)
AST SERPL W P-5'-P-CCNC: 30 U/L (ref 0–45)
BASOPHILS # BLD AUTO: 0 10E3/UL (ref 0–0.2)
BASOPHILS NFR BLD AUTO: 0 %
BILIRUB SERPL-MCNC: 0.5 MG/DL
BUN SERPL-MCNC: 17.3 MG/DL (ref 6–20)
CALCIUM SERPL-MCNC: 10.1 MG/DL (ref 8.6–10)
CHLORIDE SERPL-SCNC: 103 MMOL/L (ref 98–107)
CHOLEST SERPL-MCNC: 315 MG/DL
CREAT SERPL-MCNC: 1.12 MG/DL (ref 0.67–1.17)
DEPRECATED HCO3 PLAS-SCNC: 25 MMOL/L (ref 22–29)
EGFRCR SERPLBLD CKD-EPI 2021: 82 ML/MIN/1.73M2
EOSINOPHIL # BLD AUTO: 0.1 10E3/UL (ref 0–0.7)
EOSINOPHIL NFR BLD AUTO: 2 %
ERYTHROCYTE [DISTWIDTH] IN BLOOD BY AUTOMATED COUNT: 12.4 % (ref 10–15)
GLUCOSE SERPL-MCNC: 103 MG/DL (ref 70–99)
HCT VFR BLD AUTO: 46.9 % (ref 40–53)
HDLC SERPL-MCNC: 43 MG/DL
HGB BLD-MCNC: 16.1 G/DL (ref 13.3–17.7)
IMM GRANULOCYTES # BLD: 0 10E3/UL
IMM GRANULOCYTES NFR BLD: 0 %
LDLC SERPL CALC-MCNC: 243 MG/DL
LYMPHOCYTES # BLD AUTO: 2.4 10E3/UL (ref 0.8–5.3)
LYMPHOCYTES NFR BLD AUTO: 43 %
MCH RBC QN AUTO: 29.7 PG (ref 26.5–33)
MCHC RBC AUTO-ENTMCNC: 34.3 G/DL (ref 31.5–36.5)
MCV RBC AUTO: 86 FL (ref 78–100)
MONOCYTES # BLD AUTO: 0.5 10E3/UL (ref 0–1.3)
MONOCYTES NFR BLD AUTO: 9 %
NEUTROPHILS # BLD AUTO: 2.6 10E3/UL (ref 1.6–8.3)
NEUTROPHILS NFR BLD AUTO: 46 %
NONHDLC SERPL-MCNC: 272 MG/DL
PLATELET # BLD AUTO: 225 10E3/UL (ref 150–450)
POTASSIUM SERPL-SCNC: 4.6 MMOL/L (ref 3.4–5.3)
PROT SERPL-MCNC: 7.7 G/DL (ref 6.4–8.3)
PSA SERPL DL<=0.01 NG/ML-MCNC: 0.19 NG/ML (ref 0–2.5)
RBC # BLD AUTO: 5.43 10E6/UL (ref 4.4–5.9)
SODIUM SERPL-SCNC: 138 MMOL/L (ref 136–145)
TRIGL SERPL-MCNC: 144 MG/DL
WBC # BLD AUTO: 5.7 10E3/UL (ref 4–11)

## 2023-09-19 PROCEDURE — 85025 COMPLETE CBC W/AUTO DIFF WBC: CPT | Performed by: FAMILY MEDICINE

## 2023-09-19 PROCEDURE — 80061 LIPID PANEL: CPT | Performed by: FAMILY MEDICINE

## 2023-09-19 PROCEDURE — 99213 OFFICE O/P EST LOW 20 MIN: CPT | Mod: 25 | Performed by: FAMILY MEDICINE

## 2023-09-19 PROCEDURE — 90471 IMMUNIZATION ADMIN: CPT | Performed by: FAMILY MEDICINE

## 2023-09-19 PROCEDURE — 90686 IIV4 VACC NO PRSV 0.5 ML IM: CPT | Performed by: FAMILY MEDICINE

## 2023-09-19 PROCEDURE — G0103 PSA SCREENING: HCPCS | Performed by: FAMILY MEDICINE

## 2023-09-19 PROCEDURE — 36415 COLL VENOUS BLD VENIPUNCTURE: CPT | Performed by: FAMILY MEDICINE

## 2023-09-19 PROCEDURE — 99396 PREV VISIT EST AGE 40-64: CPT | Mod: 25 | Performed by: FAMILY MEDICINE

## 2023-09-19 PROCEDURE — 90472 IMMUNIZATION ADMIN EACH ADD: CPT | Performed by: FAMILY MEDICINE

## 2023-09-19 PROCEDURE — 80053 COMPREHEN METABOLIC PANEL: CPT | Performed by: FAMILY MEDICINE

## 2023-09-19 PROCEDURE — 90715 TDAP VACCINE 7 YRS/> IM: CPT | Performed by: FAMILY MEDICINE

## 2023-09-19 RX ORDER — PROPRANOLOL HYDROCHLORIDE 20 MG/1
TABLET ORAL
Qty: 90 TABLET | Refills: 3 | Status: SHIPPED | OUTPATIENT
Start: 2023-09-19 | End: 2023-10-14

## 2023-09-19 RX ORDER — FINASTERIDE 5 MG/1
TABLET, FILM COATED ORAL
Qty: 30 TABLET | Refills: 3 | Status: SHIPPED | OUTPATIENT
Start: 2023-09-19 | End: 2024-10-01

## 2023-09-19 ASSESSMENT — ENCOUNTER SYMPTOMS
ARTHRALGIAS: 0
DIZZINESS: 0
HEARTBURN: 1
CHILLS: 0
ENDOCRINE NEGATIVE: 1
PARESTHESIAS: 0
NERVOUS/ANXIOUS: 0
DIARRHEA: 0
CONSTIPATION: 0
WEAKNESS: 0
DYSURIA: 0
FREQUENCY: 0
JOINT SWELLING: 0
HEMATURIA: 0
EYE PAIN: 0
HEMATOCHEZIA: 0
MYALGIAS: 0
HEMATOLOGIC/LYMPHATIC NEGATIVE: 1
HEADACHES: 0
ABDOMINAL PAIN: 0
ALLERGIC/IMMUNOLOGIC NEGATIVE: 1
SORE THROAT: 0
FEVER: 0
COUGH: 0
SHORTNESS OF BREATH: 0
PALPITATIONS: 0

## 2023-09-19 ASSESSMENT — PAIN SCALES - GENERAL: PAINLEVEL: NO PAIN (0)

## 2023-09-19 NOTE — PROGRESS NOTES
SUBJECTIVE:   CC: Veto is an 48 year old who presents for preventative health visit.   Comes for annual exam.  History of alopecia, he does take finasteride        9/19/2023     9:42 AM   Additional Questions   Roomed by Timur MONTANO CMA   Accompanied by Self         9/19/2023     9:42 AM   Patient Reported Additional Medications   Patient reports taking the following new medications None       Healthy Habits:     Getting at least 3 servings of Calcium per day:  Yes    Bi-annual eye exam:  NO    Dental care twice a year:  Yes    Sleep apnea or symptoms of sleep apnea:  None    Diet:  Regular (no restrictions)    Frequency of exercise:  2-3 days/week    Duration of exercise:  30-45 minutes    Taking medications regularly:  Yes    Barriers to taking medications:  None    Medication side effects:  None    Additional concerns today:  No      Today's PHQ-2 Score:       9/19/2023     9:21 AM   PHQ-2 ( 1999 Pfizer)   Q1: Little interest or pleasure in doing things 0   Q2: Feeling down, depressed or hopeless 0   PHQ-2 Score 0   Q1: Little interest or pleasure in doing things Not at all   Q2: Feeling down, depressed or hopeless Not at all   PHQ-2 Score 0       Social History     Tobacco Use    Smoking status: Never     Passive exposure: Never    Smokeless tobacco: Never   Substance Use Topics    Alcohol use: Yes     Alcohol/week: 7.0 standard drinks of alcohol     Types: 7 Standard drinks or equivalent per week             9/16/2023    10:49 AM   Alcohol Use   Prescreen: >3 drinks/day or >7 drinks/week? No          No data to display                Last PSA: No results found for: PSA    Reviewed orders with patient. Reviewed health maintenance and updated orders accordingly - Yes  Lab work is in process  Labs reviewed in EPIC    Reviewed and updated as needed this visit by clinical staff   Tobacco  Allergies  Meds   Med Hx  Surg Hx  Fam Hx  Soc Hx        Reviewed and updated as needed this visit by Provider                  Past Medical History:   Diagnosis Date    Pure hypercholesterolemia 09/07/2018      Past Surgical History:   Procedure Laterality Date    CREATE EARDRUM OPENING,LOCAL ANESTH      Tympanostomy    CREATE EARDRUM OPENING,LOCAL ANESTH      Tympanostomy    HC EXCISION OF CHOLESTEATOMA, MIDDLE EAR  2015    right , [facility]     LASIK  2017    STAPEDECTOMY  2015    [facility] REVISE STAPEDECTOMY/STAPEDOTOMY      [facility] REVISE STAPEDECTOMY/STAPEDOTOMY      [facility] STAPEDECTOMY,FOOTPLATE DRILL OUT      dr jocelyne macias      OB History   No obstetric history on file.       Review of Systems   Constitutional:  Negative for chills and fever.   HENT:  Positive for hearing loss. Negative for congestion, ear pain and sore throat.    Eyes:  Negative for pain and visual disturbance.   Respiratory:  Negative for cough and shortness of breath.    Cardiovascular:  Negative for chest pain, palpitations and peripheral edema.   Gastrointestinal:  Positive for heartburn. Negative for abdominal pain, constipation, diarrhea and hematochezia.   Endocrine: Negative.    Genitourinary:  Negative for dysuria, frequency, genital sores, hematuria, impotence, penile discharge and urgency.   Musculoskeletal:  Negative for arthralgias, joint swelling and myalgias.   Skin:  Negative for rash.   Allergic/Immunologic: Negative.    Neurological:  Negative for dizziness, weakness, headaches and paresthesias.   Hematological: Negative.    Psychiatric/Behavioral:  Negative for mood changes. The patient is not nervous/anxious.      CONSTITUTIONAL: NEGATIVE for fever, chills, change in weight  INTEGUMENTARY/SKIN: NEGATIVE for worrisome rashes, moles or lesions  EYES: NEGATIVE for vision changes or irritation  ENT: NEGATIVE for ear, mouth and throat problems  RESP: NEGATIVE for significant cough or SOB  CV: NEGATIVE for chest pain, palpitations or peripheral edema  GI: NEGATIVE for nausea, abdominal pain, heartburn, or change in bowel habits   male:  "negative for dysuria, hematuria, decreased urinary stream, erectile dysfunction, urethral discharge  MUSCULOSKELETAL: NEGATIVE for significant arthralgias or myalgia  NEURO: NEGATIVE for weakness, dizziness or paresthesias  ENDOCRINE: NEGATIVE for temperature intolerance, skin/hair changes  HEME/ALLERGY/IMMUNE: NEGATIVE for bleeding problems  PSYCHIATRIC: NEGATIVE for changes in mood or affect    OBJECTIVE:   /75 (BP Location: Right arm, Patient Position: Chair, Cuff Size: Adult Large)   Pulse 58   Temp 98.1  F (36.7  C) (Oral)   Resp 16   Ht 1.715 m (5' 7.52\")   Wt 77.1 kg (170 lb)   SpO2 97%   BMI 26.22 kg/m      Physical Exam  GENERAL: healthy, alert and no distress  EYES: Eyes grossly normal to inspection, PERRL and conjunctivae and sclerae normal  HENT: ear canals and TM's normal, nose and mouth without ulcers or lesions  NECK: no adenopathy, no asymmetry, masses, or scars and thyroid normal to palpation  RESP: lungs clear to auscultation - no rales, rhonchi or wheezes  CV: regular rate and rhythm, normal S1 S2, no S3 or S4, no murmur, click or rub, no peripheral edema and peripheral pulses strong  ABDOMEN: soft, nontender, no hepatosplenomegaly, no masses and bowel sounds normal  MS: no gross musculoskeletal defects noted, no edema  SKIN: no suspicious lesions or rashes  NEURO: Normal strength and tone, mentation intact and speech normal  PSYCH: mentation appears normal, affect normal/bright    Diagnostic Test Results:  Labs reviewed in Epic  Orders Placed This Encounter   Procedures    REVIEW OF HEALTH MAINTENANCE PROTOCOL ORDERS    SCREENING QUESTIONS FOR ADULT IMMUNIZATIONS    INFLUENZA VACCINE IM > 6 MONTHS VALENT IIV4 (AFLURIA/FLUZONE)    TDAP 7+ (ADACEL,BOOSTRIX)    Lipid panel reflex to direct LDL Non-fasting    Comprehensive metabolic panel (BMP + Alb, Alk Phos, ALT, AST, Total. Bili, TP)    PSA, screen    CBC with platelets and differential    Colonoscopy Screening  Referral    " "CBC with platelets and differential        ASSESSMENT/PLAN:   (Z00.00) Routine general medical examination at a health care facility  (primary encounter diagnosis)  Comment: normal exam  Plan: Comprehensive metabolic panel (BMP + Alb, Alk         Phos, ALT, AST, Total. Bili, TP), PSA, screen,         CBC with platelets and differential         Discussed diet, exercise, wellness and other preventive recommendations related to health maintenance.   Follow up as needed for acute issues.    Physical exam recommended in one year.       (E78.00) Pure hypercholesterolemia  Comment:   Plan: Lipid panel reflex to direct LDL Non-fasting        Discussed diet and weigh loss  Increase physical activities.    (F41.1) Anxiety state  Comment:   Plan: propranolol (INDERAL) 20 MG tablet        Use as needed    (Z12.11) Screen for colon cancer  Comment:   Plan: Colonoscopy Screening  Referral        Screening.    (L65.9) Alopecia  Comment:   Plan: finasteride (PROSCAR) 5 MG tablet        Use, 1/4 of a tab    (Z23) Need for Tdap vaccination  Comment:   Plan: SCREENING QUESTIONS FOR ADULT IMMUNIZATIONS        Given today.      Patient has been advised of split billing requirements and indicates understanding: Yes      COUNSELING:   Reviewed preventive health counseling, as reflected in patient instructions       Regular exercise       Healthy diet/nutrition       Immunizations  Vaccinated for: Influenza and TDAP           Colorectal cancer screening       Prostate cancer screening      BMI:   Estimated body mass index is 26.22 kg/m  as calculated from the following:    Height as of this encounter: 1.715 m (5' 7.52\").    Weight as of this encounter: 77.1 kg (170 lb).   Weight management plan: Discussed healthy diet and exercise guidelines      He reports that he has never smoked. He has never been exposed to tobacco smoke. He has never used smokeless tobacco.            Mingo Franks MD  [facility]" [place]

## 2023-10-13 ENCOUNTER — TELEPHONE (OUTPATIENT)
Dept: FAMILY MEDICINE | Facility: CLINIC | Age: 48
End: 2023-10-13
Payer: COMMERCIAL

## 2023-10-13 DIAGNOSIS — F41.1 ANXIETY STATE: ICD-10-CM

## 2023-10-14 RX ORDER — PROPRANOLOL HYDROCHLORIDE 20 MG/1
TABLET ORAL
Qty: 180 TABLET | Refills: 3 | Status: SHIPPED | OUTPATIENT
Start: 2023-10-14

## 2024-10-01 ENCOUNTER — TELEPHONE (OUTPATIENT)
Dept: FAMILY MEDICINE | Facility: CLINIC | Age: 49
End: 2024-10-01
Payer: COMMERCIAL

## 2024-10-01 DIAGNOSIS — L65.9 ALOPECIA: ICD-10-CM

## 2024-10-01 RX ORDER — FINASTERIDE 5 MG/1
TABLET, FILM COATED ORAL
Qty: 10 TABLET | Refills: 0 | Status: SHIPPED | OUTPATIENT
Start: 2024-10-01

## 2024-10-01 NOTE — TELEPHONE ENCOUNTER
I have attempted to contact this patient by phone with the following results: left message to return my call on answering machine.

## 2024-10-24 DIAGNOSIS — L65.9 ALOPECIA: ICD-10-CM

## 2024-10-24 NOTE — TELEPHONE ENCOUNTER
[facility] #[id number] Pharmacy is requesting a 90-day supply.  finasteride (PROSCAR) 5 MG tablet   Quantity Requested: 21.0

## 2024-10-25 RX ORDER — FINASTERIDE 5 MG/1
TABLET, FILM COATED ORAL
Qty: 10 TABLET | Refills: 0 | Status: CANCELLED | OUTPATIENT
Start: 2024-10-25

## 2024-10-25 NOTE — TELEPHONE ENCOUNTER
Called patient and left a voicemail message to call the clinic and schedule an Annual physical/ med check appointment.      Giovana Irby

## 2024-10-26 ENCOUNTER — MYC REFILL (OUTPATIENT)
Dept: FAMILY MEDICINE | Facility: CLINIC | Age: 49
End: 2024-10-26
Payer: COMMERCIAL

## 2024-10-26 DIAGNOSIS — L65.9 ALOPECIA: ICD-10-CM

## 2024-10-26 DIAGNOSIS — F41.1 ANXIETY STATE: ICD-10-CM

## 2024-10-27 DIAGNOSIS — L65.9 ALOPECIA: ICD-10-CM

## 2024-10-28 RX ORDER — FINASTERIDE 5 MG/1
TABLET, FILM COATED ORAL
Qty: 10 TABLET | Refills: 0 | Status: SHIPPED | OUTPATIENT
Start: 2024-10-28

## 2024-10-28 RX ORDER — FINASTERIDE 5 MG/1
TABLET, FILM COATED ORAL
Qty: 7 TABLET | Refills: 1 | OUTPATIENT
Start: 2024-10-28

## 2024-10-28 RX ORDER — PROPRANOLOL HCL 20 MG
TABLET ORAL
Qty: 180 TABLET | Refills: 0 | Status: SHIPPED | OUTPATIENT
Start: 2024-10-28

## 2024-11-01 NOTE — TELEPHONE ENCOUNTER
Routing to RN. Please un pend order and close encounter.  Dr Franks already sent this medication to pharmacy.    HAILEY Dale  [facility]

## 2024-11-27 ENCOUNTER — OFFICE VISIT (OUTPATIENT)
Dept: FAMILY MEDICINE | Facility: CLINIC | Age: 49
End: 2024-11-27
Payer: COMMERCIAL

## 2024-11-27 VITALS
DIASTOLIC BLOOD PRESSURE: 80 MMHG | SYSTOLIC BLOOD PRESSURE: 118 MMHG | WEIGHT: 172 LBS | BODY MASS INDEX: 27 KG/M2 | OXYGEN SATURATION: 98 % | HEART RATE: 50 BPM | RESPIRATION RATE: 13 BRPM | TEMPERATURE: 98.5 F | HEIGHT: 67 IN

## 2024-11-27 DIAGNOSIS — Z00.00 ENCOUNTER FOR ANNUAL PHYSICAL EXAM: Primary | ICD-10-CM

## 2024-11-27 DIAGNOSIS — Z12.5 SCREENING FOR PROSTATE CANCER: ICD-10-CM

## 2024-11-27 DIAGNOSIS — L65.9 ALOPECIA: ICD-10-CM

## 2024-11-27 DIAGNOSIS — Z12.11 SCREEN FOR COLON CANCER: ICD-10-CM

## 2024-11-27 DIAGNOSIS — F41.1 ANXIETY STATE: ICD-10-CM

## 2024-11-27 DIAGNOSIS — E78.2 MIXED HYPERLIPIDEMIA: ICD-10-CM

## 2024-11-27 LAB
ALBUMIN SERPL BCG-MCNC: 4.6 G/DL (ref 3.5–5.2)
ALP SERPL-CCNC: 87 U/L (ref 40–150)
ALT SERPL W P-5'-P-CCNC: 26 U/L (ref 0–70)
ANION GAP SERPL CALCULATED.3IONS-SCNC: 8 MMOL/L (ref 7–15)
AST SERPL W P-5'-P-CCNC: 29 U/L (ref 0–45)
BILIRUB SERPL-MCNC: 0.4 MG/DL
BUN SERPL-MCNC: 16.8 MG/DL (ref 6–20)
CALCIUM SERPL-MCNC: 10 MG/DL (ref 8.8–10.4)
CHLORIDE SERPL-SCNC: 105 MMOL/L (ref 98–107)
CHOLEST SERPL-MCNC: 264 MG/DL
CREAT SERPL-MCNC: 1.3 MG/DL (ref 0.67–1.17)
EGFRCR SERPLBLD CKD-EPI 2021: 68 ML/MIN/1.73M2
FASTING STATUS PATIENT QL REPORTED: YES
FASTING STATUS PATIENT QL REPORTED: YES
GLUCOSE SERPL-MCNC: 97 MG/DL (ref 70–99)
HCO3 SERPL-SCNC: 27 MMOL/L (ref 22–29)
HDLC SERPL-MCNC: 40 MG/DL
LDLC SERPL CALC-MCNC: 192 MG/DL
NONHDLC SERPL-MCNC: 224 MG/DL
POTASSIUM SERPL-SCNC: 5.3 MMOL/L (ref 3.4–5.3)
PROT SERPL-MCNC: 7.4 G/DL (ref 6.4–8.3)
PSA SERPL DL<=0.01 NG/ML-MCNC: 0.15 NG/ML (ref 0–2.5)
SODIUM SERPL-SCNC: 140 MMOL/L (ref 135–145)
TRIGL SERPL-MCNC: 161 MG/DL

## 2024-11-27 PROCEDURE — 80053 COMPREHEN METABOLIC PANEL: CPT | Performed by: FAMILY MEDICINE

## 2024-11-27 PROCEDURE — G0103 PSA SCREENING: HCPCS | Performed by: FAMILY MEDICINE

## 2024-11-27 PROCEDURE — 80061 LIPID PANEL: CPT | Performed by: FAMILY MEDICINE

## 2024-11-27 PROCEDURE — 36415 COLL VENOUS BLD VENIPUNCTURE: CPT | Performed by: FAMILY MEDICINE

## 2024-11-27 RX ORDER — PROPRANOLOL HCL 20 MG
TABLET ORAL
Qty: 180 TABLET | Refills: 1 | Status: SHIPPED | OUTPATIENT
Start: 2024-11-27

## 2024-11-27 RX ORDER — FINASTERIDE 5 MG/1
TABLET, FILM COATED ORAL
Qty: 10 TABLET | Refills: 3 | Status: SHIPPED | OUTPATIENT
Start: 2024-11-27

## 2024-11-27 SDOH — HEALTH STABILITY: PHYSICAL HEALTH: ON AVERAGE, HOW MANY DAYS PER WEEK DO YOU ENGAGE IN MODERATE TO STRENUOUS EXERCISE (LIKE A BRISK WALK)?: 4 DAYS

## 2024-11-27 SDOH — HEALTH STABILITY: PHYSICAL HEALTH: ON AVERAGE, HOW MANY MINUTES DO YOU ENGAGE IN EXERCISE AT THIS LEVEL?: 50 MIN

## 2024-11-27 ASSESSMENT — SOCIAL DETERMINANTS OF HEALTH (SDOH): HOW OFTEN DO YOU GET TOGETHER WITH FRIENDS OR RELATIVES?: ONCE A WEEK

## 2024-11-27 NOTE — PROGRESS NOTES
"Preventive Care Visit  [facility]  Mingo Franks MD, Family Medicine  Nov 27, 2024      Assessment & Plan     Encounter for annual physical exam   Discussed diet, exercise, wellness and other preventive recommendations related to health maintenance.   Follow up as needed for acute issues.    Physical exam recommended in one year.     - Lipid panel reflex to direct LDL Fasting; Future  - Comprehensive metabolic panel (BMP + Alb, Alk Phos, ALT, AST, Total. Bili, TP); Future    Alopecia  PSA today.  - finasteride (PROSCAR) 5 MG tablet; TAKE 1/4 TABLET BY MOUTH ONCE DAILY    Anxiety state  Well controlled with his med  Use as needed for social events.  - propranolol (INDERAL) 20 MG tablet; Take 1/2-1 tablet as needed for anxiety up to two times a day    Screen for colon cancer  Had FIT test this year, was negative.    Screening for prostate cancer    - PSA, screen; Future    History of hyperlipidemia.  Discussed diet modifications and weight loss  Lipid panel today  Increase fiber intake.    Low risks.  The 10-year ASCVD risk score (Gabriela DK, et al., 2019) is: 5.3%    Values used to calculate the score:      Age: 49 years      Sex: Male      Is Non- : No      Diabetic: No      Tobacco smoker: No      Systolic Blood Pressure: 118 mmHg      Is BP treated: No      HDL Cholesterol: 43 mg/dL      Total Cholesterol: 315 mg/dL      Patient has been advised of split billing requirements and indicates understanding: Yes        BMI  Estimated body mass index is 26.68 kg/m  as calculated from the following:    Height as of this encounter: 1.71 m (5' 7.32\").    Weight as of this encounter: 78 kg (172 lb).   Weight management plan: Discussed healthy diet and exercise guidelines    Counseling  Appropriate preventive services were addressed with this patient via screening, questionnaire, or discussion as appropriate for fall prevention, nutrition, physical activity, Tobacco-use " cessation, social engagement, weight loss and cognition.  Checklist reviewing preventive services available has been given to the patient.  Reviewed patient's diet, addressing concerns and/or questions.   He is at risk for psychosocial distress and has been provided with information to reduce risk.       Work on weight loss  Regular exercise    Keely Laws is a 49 year old, presenting for the following:  Physical    Patient comes in for an annual exam.  He remains active.  History of Alopecia, has been on finasteride no side effect.  He reports he had a FIT test, colon cancer screening this year, was negative.    History of hyperlipidemia.        11/27/2024     7:13 AM   Additional Questions   Roomed by oneyda joseph ma   Accompanied by self         11/27/2024     7:13 AM   Patient Reported Additional Medications   Patient reports taking the following new medications magnesium prn, Vitamin B prn, Vitamin C prn, l-theanine prn          HPI      Health Care Directive  Patient does not have a Health Care Directive: Discussed advance care planning with patient; however, patient declined at this time.      11/27/2024   General Health   How would you rate your overall physical health? Good   Feel stress (tense, anxious, or unable to sleep) To some extent      (!) STRESS CONCERN      11/27/2024   Nutrition   Three or more servings of calcium each day? Yes   Diet: Regular (no restrictions)   How many servings of fruit and vegetables per day? (!) 2-3   How many sweetened beverages each day? 0-1            11/27/2024   Exercise   Days per week of moderate/strenous exercise 4 days   Average minutes spent exercising at this level 50 min            11/27/2024   Social Factors   Frequency of gathering with friends or relatives Once a week   Worry food won't last until get money to buy more No   Food not last or not have enough money for food? No   Do you have housing? (Housing is defined as stable permanent housing and does  not include staying ouside in a car, in a tent, in an abandoned building, in an overnight shelter, or couch-surfing.) Yes   Are you worried about losing your housing? No   Lack of transportation? No   Unable to get utilities (heat,electricity)? No            11/27/2024   Dental   Dentist two times every year? Yes            11/27/2024   TB Screening   Were you born outside of the [place]? No            Today's PHQ-2 Score:       11/27/2024     7:05 AM   PHQ-2 ( 1999 [employer])   Q1: Little interest or pleasure in doing things 0    Q2: Feeling down, depressed or hopeless 0    PHQ-2 Score 0    Q1: Little interest or pleasure in doing things Not at all   Q2: Feeling down, depressed or hopeless Not at all   PHQ-2 Score 0       Patient-reported           11/27/2024   Substance Use   Alcohol more than 3/day or more than 7/wk No   Do you use any other substances recreationally? No        Social History     Tobacco Use    Smoking status: Never     Passive exposure: Never    Smokeless tobacco: Never   Vaping Use    Vaping status: Never Used   Substance Use Topics    Alcohol use: Yes     Alcohol/week: 7.0 standard drinks of alcohol     Types: 7 Standard drinks or equivalent per week    Drug use: No           11/27/2024   STI Screening   New sexual partner(s) since last STI/HIV test? No      ASCVD Risk   The 10-year ASCVD risk score (Gabriela DA SILVA, et al., 2019) is: 5.3%    Values used to calculate the score:      Age: 49 years      Sex: Male      Is Non- : No      Diabetic: No      Tobacco smoker: No      Systolic Blood Pressure: 118 mmHg      Is BP treated: No      HDL Cholesterol: 43 mg/dL      Total Cholesterol: 315 mg/dL        11/27/2024   Contraception/Family Planning   Questions about contraception or family planning No           Reviewed and updated as needed this visit by Provider                    Past Medical History:   Diagnosis Date    Pure hypercholesterolemia 09/07/2018     Past Surgical  "History:   Procedure Laterality Date    CREATE EARDRUM OPENING,LOCAL ANESTH      Tympanostomy    CREATE EARDRUM OPENING,LOCAL ANESTH      Tympanostomy    [facility] EXCISION OF CHOLESTEATOMA, MIDDLE EAR  2015    right , [facility]     LASIK  2017    STAPEDECTOMY  2015    [facility] REVISE STAPEDECTOMY/STAPEDOTOMY      [facility] REVISE STAPEDECTOMY/STAPEDOTOMY      [facility] STAPEDECTOMY,FOOTPLATE DRILL OUT      dr jocelyne macias      OB History   No obstetric history on file.         Review of Systems  Constitutional, HEENT, cardiovascular, pulmonary, GI, , musculoskeletal, neuro, skin, endocrine and psych systems are negative, except as otherwise noted.     Objective    Exam  /80 (BP Location: Right arm, Patient Position: Chair, Cuff Size: Adult Large)   Pulse 50   Temp 98.5  F (36.9  C) (Oral)   Resp 13   Ht 1.71 m (5' 7.32\")   Wt 78 kg (172 lb)   SpO2 98%   BMI 26.68 kg/m     Estimated body mass index is 26.68 kg/m  as calculated from the following:    Height as of this encounter: 1.71 m (5' 7.32\").    Weight as of this encounter: 78 kg (172 lb).    Physical Exam  GENERAL: alert and no distress  EYES: Eyes grossly normal to inspection, PERRL and conjunctivae and sclerae normal  HENT: ear canals and TM's normal, nose and mouth without ulcers or lesions  NECK: no adenopathy, no asymmetry, masses, or scars  RESP: lungs clear to auscultation - no rales, rhonchi or wheezes  CV: regular rate and rhythm, normal S1 S2, no S3 or S4, no murmur, click or rub, no peripheral edema  ABDOMEN: soft, nontender, no hepatosplenomegaly, no masses and bowel sounds normal  MS: no gross musculoskeletal defects noted, no edema  SKIN: no suspicious lesions or rashes  NEURO: Normal strength and tone, mentation intact and speech normal  PSYCH: mentation appears normal, affect normal/bright      Orders Placed This Encounter   Procedures    REVIEW OF HEALTH MAINTENANCE PROTOCOL ORDERS    INFLUENZA VACCINE, SPLIT VIRUS, TRIVALENT,PF " (FLUZONE\FLUARIX)    PSA, screen    Lipid panel reflex to direct LDL Fasting    Comprehensive metabolic panel (BMP + Alb, Alk Phos, ALT, AST, Total. Bili, TP)          Signed Electronically by: Mingo Franks MD

## 2025-01-29 ENCOUNTER — OFFICE VISIT (OUTPATIENT)
Dept: FAMILY MEDICINE | Facility: CLINIC | Age: 50
End: 2025-01-29
Payer: COMMERCIAL

## 2025-01-29 ENCOUNTER — ANCILLARY PROCEDURE (OUTPATIENT)
Dept: GENERAL RADIOLOGY | Facility: CLINIC | Age: 50
End: 2025-01-29
Attending: FAMILY MEDICINE
Payer: COMMERCIAL

## 2025-01-29 ENCOUNTER — NURSE TRIAGE (OUTPATIENT)
Dept: FAMILY MEDICINE | Facility: CLINIC | Age: 50
End: 2025-01-29

## 2025-01-29 VITALS
HEART RATE: 80 BPM | SYSTOLIC BLOOD PRESSURE: 102 MMHG | DIASTOLIC BLOOD PRESSURE: 80 MMHG | WEIGHT: 170.8 LBS | RESPIRATION RATE: 20 BRPM | HEIGHT: 67 IN | OXYGEN SATURATION: 99 % | TEMPERATURE: 99.1 F | BODY MASS INDEX: 26.81 KG/M2

## 2025-01-29 DIAGNOSIS — R09.89 CHEST CONGESTION: ICD-10-CM

## 2025-01-29 DIAGNOSIS — R05.1 ACUTE COUGH: ICD-10-CM

## 2025-01-29 DIAGNOSIS — R09.89 CHEST CONGESTION: Primary | ICD-10-CM

## 2025-01-29 LAB
FLUAV AG SPEC QL IA: NEGATIVE
FLUBV AG SPEC QL IA: NEGATIVE

## 2025-01-29 PROCEDURE — 87804 INFLUENZA ASSAY W/OPTIC: CPT | Performed by: FAMILY MEDICINE

## 2025-01-29 PROCEDURE — 99214 OFFICE O/P EST MOD 30 MIN: CPT | Performed by: FAMILY MEDICINE

## 2025-01-29 PROCEDURE — 71046 X-RAY EXAM CHEST 2 VIEWS: CPT | Mod: TC | Performed by: RADIOLOGY

## 2025-01-29 RX ORDER — ALBUTEROL SULFATE 90 UG/1
2 INHALANT RESPIRATORY (INHALATION) EVERY 6 HOURS PRN
Qty: 18 G | Refills: 0 | Status: SHIPPED | OUTPATIENT
Start: 2025-01-29

## 2025-01-29 ASSESSMENT — ENCOUNTER SYMPTOMS
VOMITING: 0
SYNCOPE: 0
SPUTUM PRODUCTION: 1
RHINORRHEA: 0
CLAUDICATION: 1
ABDOMINAL PAIN: 0
LEG PAIN: 1
HEMOPTYSIS: 0
PND: 1
FEVER: 1
ORTHOPNEA: 1
NECK PAIN: 1
HEADACHES: 1
SORE THROAT: 0
SWOLLEN GLANDS: 1
WHEEZING: 1

## 2025-01-29 NOTE — PROGRESS NOTES
Assessment & Plan     Chest congestion    - XR Chest 2 Views; Future  - albuterol (PROAIR HFA/PROVENTIL HFA/VENTOLIN HFA) 108 (90 Base) MCG/ACT inhaler; Inhale 2 puffs into the lungs every 6 hours as needed for shortness of breath, wheezing or cough.    Acute cough    - XR Chest 2 Views; Future  - Influenza A & B Antigen - Clinic Collect  - albuterol (PROAIR HFA/PROVENTIL HFA/VENTOLIN HFA) 108 (90 Base) MCG/ACT inhaler; Inhale 2 puffs into the lungs every 6 hours as needed for shortness of breath, wheezing or cough.    Reviewed chest x-ray with patient, was negative.  Negative for influenza.  Discussed with patient likely viral in nature, advised with supportive care, increase fluid intake, continue with ibuprofen and/or Tylenol as needed.  I given prescription for albuterol inhaler to use as needed      Subjective   Veto is a 49 year old, presenting for the following health issues:  URI    Patient reports for the past 4 days he has been having cough, chest congestion, tightness in his chest.  He had a fever of 101.5,  He reports he was exposed to something from a friend who has similar symptoms.  Patient had his influenza vaccine.    He does not report headache, chest tightness, chills.  He has no diarrhea no vomiting.  No skin rashes.    No sickness in the house.        1/29/2025    10:02 AM   Additional Questions   Roomed by oneyda joseph ma   Accompanied by self         1/29/2025    10:02 AM   Patient Reported Additional Medications   Patient reports taking the following new medications none     History of Present Illness       Reason for visit:  Sore lungs, cough, fever of 101.5 tympanic  Symptom onset:  3-7 days ago  Symptoms include:  Sore lungs  cough  fever  Symptom intensity:  Moderate  Symptom progression:  Worsening  Had these symptoms before:  No  What makes it worse:  Coughing  What makes it better:  No   He is taking medications regularly.     Patient was ice fishing with a friend and they were  "drinking out of the same bottle and friend went home and his whole family got sick.        Review of Systems  Constitutional, HEENT, cardiovascular, pulmonary, GI, , musculoskeletal, neuro, skin, endocrine and psych systems are negative, except as otherwise noted.      Objective    /80 (BP Location: Right arm, Patient Position: Sitting, Cuff Size: Adult Large)   Pulse 80   Temp 99.1  F (37.3  C) (Tympanic)   Resp 20   Ht 1.71 m (5' 7.32\")   Wt 77.5 kg (170 lb 12.8 oz)   SpO2 99%   BMI 26.49 kg/m    Body mass index is 26.49 kg/m .  Physical Exam   GENERAL: alert and no distress  HENT: ear canals and TM's normal, nose and mouth without ulcers or lesions  NECK: no adenopathy, no asymmetry, masses, or scars  RESP: lungs clear to auscultation - no rales, rhonchi or wheezes  CV: regular rate and rhythm, normal S1 S2, no S3 or S4, no murmur, click or rub, no peripheral edema  ABDOMEN: soft, nontender, no hepatosplenomegaly, no masses and bowel sounds normal  PSYCH: mentation appears normal, affect normal/bright    CXR - Reviewed and interpreted by me Normal- no infiltrates, effusions, pneumothoraces, cardiomegaly or masses  Negative for Influenza   Orders Placed This Encounter   Procedures    XR Chest 2 Views    Influenza A & B Antigen - Clinic Collect             Signed Electronically by: Mingo Franks MD    Answers submitted by the patient for this visit:  Shortness of Breath Questionnaire (Submitted on 1/29/2025)  Chronicity: new  Onset: in the past 7 days  Frequency: every few minutes  Progression since onset: gradually worsening  Episode duration: 1 Minutes  abdominal pain: No  chest pain: Yes  hemoptysis: No  sputum production: Yes  PND: Yes  ear pain: No  syncope: No  fever: Yes  headaches: Yes  claudication: Yes  leg pain: Yes  leg swelling: No  neck pain: Yes  rash: No  rhinorrhea: No  orthopnea: Yes  sore throat: No  coryza: No  swollen glands: Yes  vomiting: No  wheezing: Yes  Aggravating " factors: emotional upset, exercise, lying flat  Questionnaire about: Chronic problems general questions HPI Form (Submitted on 1/29/2025)  Chief Complaint: Chronic problems general questions HPI Form

## 2025-01-29 NOTE — TELEPHONE ENCOUNTER
"CARE ADVICE: GO TO OFFICE NOW    RN scheduled patient for 10 am    Patient states has been sick since Sunday.    Shared Gatorade bottle with friends this W/E.    Patient has developed cough and fever.    Fever 101.5 last evening.    Cough with very little phlegm.  Light colored.    Chest pain when coughing.    RN advised patient should be evaluated today.    RN assisted in scheduling appointment.      Reason for Disposition   Wheezing is present    Additional Information   Negative: Bluish (or gray) lips or face   Negative: SEVERE difficulty breathing (e.g., struggling for each breath, speaks in single words)   Negative: Rapid onset of cough and has hives   Negative: Coughing started suddenly after medicine, an allergic food or bee sting   Negative: Difficulty breathing after exposure to flames, smoke, or fumes   Negative: Sounds like a life-threatening emergency to the triager   Negative: Previous asthma attacks and this feels like asthma attack   Negative: Dry cough (non-productive; no sputum or minimal clear sputum) and within 14 days of COVID-19 Exposure   Negative: MODERATE difficulty breathing (e.g., speaks in phrases, SOB even at rest, pulse 100-120) and still present when not coughing   Negative: Chest pain present when not coughing   Negative: Passed out (e.g., fainted, lost consciousness, blacked out and was not responding)   Negative: Patient sounds very sick or weak to the triager    Answer Assessment - Initial Assessment Questions  1. ONSET: \"When did the cough begin?\"       Sunday  2. SEVERITY: \"How bad is the cough today?\"       Goes awhile then coughs  3. SPUTUM: \"Describe the color of your sputum\" (e.g., none, dry cough; clear, white, yellow, green)      Light  small amount  4. HEMOPTYSIS: \"Are you coughing up any blood?\" If Yes, ask: \"How much?\" (e.g., flecks, streaks, tablespoons, etc.)     no   5. DIFFICULTY BREATHING: \"Are you having difficulty breathing?\" If Yes, ask: \"How bad is it?\" (e.g., " "mild, moderate, severe)       Little wheezing.  Coughing difficult to catch breath.  Every hour  6. FEVER: \"Do you have a fever?\" If Yes, ask: \"What is your temperature, how was it measured, and when did it start?\"      101.5 last night.  Fever feels better  7. CARDIAC HISTORY: \"Do you have any history of heart disease?\" (e.g., heart attack, congestive heart failure)         8. LUNG HISTORY: \"Do you have any history of lung disease?\"  (e.g., pulmonary embolus, asthma, emphysema)      no  9. PE RISK FACTORS: \"Do you have a history of blood clots?\" (or: recent major surgery, recent prolonged travel, bedridden)        10. OTHER SYMPTOMS: \"Do you have any other symptoms?\" (e.g., runny nose, wheezing, chest pain)        Wheezing  pain when coughing  11. PREGNANCY: \"Is there any chance you are pregnant?\" \"When was your last menstrual period?\"        na  12. TRAVEL: \"Have you traveled out of the country in the last month?\" (e.g., travel history, exposures)        na    Protocols used: Cough-A-OH      Kade Flores, ALIZA, BSN, PHN  [facility]  "

## 2025-05-08 DIAGNOSIS — L65.9 ALOPECIA: ICD-10-CM

## 2025-05-08 RX ORDER — FINASTERIDE 5 MG/1
TABLET, FILM COATED ORAL
Qty: 10 TABLET | Refills: 5 | Status: SHIPPED | OUTPATIENT
Start: 2025-05-08

## 2025-06-25 ENCOUNTER — TELEPHONE (OUTPATIENT)
Dept: FAMILY MEDICINE | Facility: CLINIC | Age: 50
End: 2025-06-25
Payer: COMMERCIAL

## 2025-06-25 NOTE — TELEPHONE ENCOUNTER
Patient Quality Outreach    Patient is due for the following:   Colon Cancer Screening  Physical Preventive Adult Physical    Action(s) Taken:   Schedule a Adult Preventative    Type of outreach:    Sent [employer] message.    Questions for provider review:    None         Tahira Govea CMA  Chart routed to None.